# Patient Record
Sex: FEMALE | Race: AMERICAN INDIAN OR ALASKA NATIVE
[De-identification: names, ages, dates, MRNs, and addresses within clinical notes are randomized per-mention and may not be internally consistent; named-entity substitution may affect disease eponyms.]

---

## 2018-11-07 ENCOUNTER — HOSPITAL ENCOUNTER (EMERGENCY)
Dept: HOSPITAL 46 - ED | Age: 43
Discharge: HOME | End: 2018-11-07
Payer: COMMERCIAL

## 2018-11-07 VITALS — HEIGHT: 66 IN | WEIGHT: 270 LBS | BODY MASS INDEX: 43.39 KG/M2

## 2018-11-07 DIAGNOSIS — R00.2: ICD-10-CM

## 2018-11-07 DIAGNOSIS — Z79.4: ICD-10-CM

## 2018-11-07 DIAGNOSIS — I10: ICD-10-CM

## 2018-11-07 DIAGNOSIS — E78.00: ICD-10-CM

## 2018-11-07 DIAGNOSIS — E11.9: ICD-10-CM

## 2018-11-07 DIAGNOSIS — Z91.040: ICD-10-CM

## 2018-11-07 DIAGNOSIS — R07.89: Primary | ICD-10-CM

## 2018-11-07 NOTE — XMS
Encounter Summary
  Created on: 2018
 
 Catrachito Elam
 External Reference #: [id number]
 : [birth year]
 Sex: Female
 
 Demographics
 
 
+-----------------------+---------------------------+
| Address               | [address]       |
|                       | [place]  [address] |
+-----------------------+---------------------------+
| Home Phone            | [phone]           |
+-----------------------+---------------------------+
| Preferred Language    | Unknown                   |
+-----------------------+---------------------------+
| Marital Status        | Single                    |
+-----------------------+---------------------------+
| Quaker Affiliation | Unknown                   |
+-----------------------+---------------------------+
| Race                  | Unknown                   |
+-----------------------+---------------------------+
| Ethnic Group          | Unknown                   |
+-----------------------+---------------------------+
 
 
 Author
 
 
+--------------+-----------------------+
| Author       | [employer] |
+--------------+-----------------------+
| Organization | [facility] |
+--------------+-----------------------+
| Address      | Unknown               |
+--------------+-----------------------+
| Phone        | Unavailable           |
+--------------+-----------------------+
 
 
 
 Support
 
 
+--------------+--------------+---------+-----------------+
| Name         | Relationship | Address | Phone           |
+--------------+--------------+---------+-----------------+
| Renu Austin | ECON         | Unknown | [phone] |
+--------------+--------------+---------+-----------------+
 
 
 
 Care Team Providers
 
 
 
+------------------------+------+-----------------+
| Care Team Member Name  | Role | Phone           |
+------------------------+------+-----------------+
| Cassandra Teixeira PA-C | PCP  | [phone] |
+------------------------+------+-----------------+
 
 
 
 Encounter Details
 
 
+--------+-------------+----------------------+----------------------+-------------+
| Date   | Type        | Department           | Care Team            | Description |
+--------+-------------+----------------------+----------------------+-------------+
| / | Documentati |   [facility]             |   [facility] Villalobos,   |             |
| 2018   | on Only     | [facility]  | ARNP  [address]  |             |
|        |             |  [address]     | [address] [place],  |             |
|        |             | [place]  | [place] [address]             |             |
|        |             | [address]           | [phone]         |             |
|        |             | [phone]         | [phone] (Fax)   |             |
+--------+-------------+----------------------+----------------------+-------------+
 
 
 
 Social History
 
 
+--------------+-------+-----------+--------+------+
| Tobacco Use  | Types | Packs/Day | Years  | Date |
|              |       |           | Used   |      |
+--------------+-------+-----------+--------+------+
| Never Smoker |       |           |        |      |
+--------------+-------+-----------+--------+------+
 
 
 
+---------------------+---+---+---+
| Smokeless Tobacco:  |   |   |   |
| Never Used          |   |   |   |
+---------------------+---+---+---+
 
 
 
+------------------+---------------+
| Sex Assigned at  | Date Recorded |
| Birth            |               |
+------------------+---------------+
| Not on file      |               |
+------------------+---------------+
 as of this encounter
 
 Progress Jacqui Chavesderon, Teodora RACHEL - 2018  2:22 PM PDTImaging reportsin this encounter
 
 Plan of Treatment
 Not on fileas of this encounter
 
 Visit Diagnoses
 Not on filein this encounter

## 2018-11-07 NOTE — XMS
Encounter Summary
  Created on: 2018
 
 Catrachito Elam
 External Reference #: [id number]
 : [birth year]
 Sex: Female
 
 Demographics
 
 
+-----------------------+---------------------------+
| Address               | [address]       |
|                       | [place]  [address] |
+-----------------------+---------------------------+
| Home Phone            | [phone]           |
+-----------------------+---------------------------+
| Preferred Language    | Unknown                   |
+-----------------------+---------------------------+
| Marital Status        | Single                    |
+-----------------------+---------------------------+
| Quaker Affiliation | Unknown                   |
+-----------------------+---------------------------+
| Race                  | Unknown                   |
+-----------------------+---------------------------+
| Ethnic Group          | Unknown                   |
+-----------------------+---------------------------+
 
 
 Author
 
 
+--------------+-----------------------+
| Author       | [employer] |
+--------------+-----------------------+
| Organization | [facility] |
+--------------+-----------------------+
| Address      | Unknown               |
+--------------+-----------------------+
| Phone        | Unavailable           |
+--------------+-----------------------+
 
 
 
 Support
 
 
+--------------+--------------+---------+-----------------+
| Name         | Relationship | Address | Phone           |
+--------------+--------------+---------+-----------------+
| Renu Austin | ECON         | Unknown | [phone] |
+--------------+--------------+---------+-----------------+
 
 
 
 Care Team Providers
 
 
 
+------------------------+------+-----------------+
| Care Team Member Name  | Role | Phone           |
+------------------------+------+-----------------+
| Cassandra Teixeira PA-C | PCP  | [phone] |
+------------------------+------+-----------------+
 
 
 
 Encounter Details
 
 
+--------+------------+----------------------+-----------+-------------+
| Date   | Type       | Department           | Care Team | Description |
+--------+------------+----------------------+-----------+-------------+
| / | Procedure  |   [facility]     |           |             |
|    | Pass       | [facility] |           |             |
|        |            |  RADIOLOGY  [address]      |           |             |
|        |            | [address]           |           |             |
|        |            | [place] [address]   |           |             |
|        |            | [phone]         |           |             |
+--------+------------+----------------------+-----------+-------------+
 
 
 
 Social History
 
 
+--------------+-------+-----------+--------+------+
| Tobacco Use  | Types | Packs/Day | Years  | Date |
|              |       |           | Used   |      |
+--------------+-------+-----------+--------+------+
| Never Smoker |       |           |        |      |
+--------------+-------+-----------+--------+------+
 
 
 
+---------------------+---+---+---+
| Smokeless Tobacco:  |   |   |   |
| Never Used          |   |   |   |
+---------------------+---+---+---+
 
 
 
+------------------+---------------+
| Sex Assigned at  | Date Recorded |
| Birth            |               |
+------------------+---------------+
| Not on file      |               |
+------------------+---------------+
 as of this encounter
 
 Plan of Treatment
 Not on fileas of this encounter
 
 Visit Diagnoses
 Not on filein this encounter

## 2018-11-07 NOTE — XMS
Encounter Summary
  Created on: 2018
 
 Catrachito Elam
 External Reference #: [id number]
 : [birth year]
 Sex: Female
 
 Demographics
 
 
+-----------------------+---------------------------+
| Address               | [address]       |
|                       | [place]  [address] |
+-----------------------+---------------------------+
| Home Phone            | [phone]           |
+-----------------------+---------------------------+
| Preferred Language    | Unknown                   |
+-----------------------+---------------------------+
| Marital Status        | Single                    |
+-----------------------+---------------------------+
| Gnosticist Affiliation | Unknown                   |
+-----------------------+---------------------------+
| Race                  | Unknown                   |
+-----------------------+---------------------------+
| Ethnic Group          | Unknown                   |
+-----------------------+---------------------------+
 
 
 Author
 
 
+--------------+-----------------------+
| Author       | [employer] |
+--------------+-----------------------+
| Organization | [facility] |
+--------------+-----------------------+
| Address      | Unknown               |
+--------------+-----------------------+
| Phone        | Unavailable           |
+--------------+-----------------------+
 
 
 
 Support
 
 
+--------------+--------------+---------+-----------------+
| Name         | Relationship | Address | Phone           |
+--------------+--------------+---------+-----------------+
| Renu Austin | ECON         | Unknown | [phone] |
+--------------+--------------+---------+-----------------+
 
 
 
 Care Team Providers
 
 
 
+------------------------+------+-----------------+
| Care Team Member Name  | Role | Phone           |
+------------------------+------+-----------------+
| Cassandra Teixeira PA-C | PCP  | [phone] |
+------------------------+------+-----------------+
 
 
 
 Encounter Details
 
 
+--------+-----------+----------------------+--------------------+-------------+
| Date   | Type      | Department           | Care Team          | Description |
+--------+-----------+----------------------+--------------------+-------------+
| / | Telephone |   [facility]             |   [facility],  |             |
|    |           | [facility]  | CNA                |             |
|        |           |  [address]    |                    |             |
|        |           | [place]  [place], [place]  |                    |             |
|        |           | [address]           |                    |             |
|        |           | [phone]         |                    |             |
+--------+-----------+----------------------+--------------------+-------------+
 
 
 
 Social History
 
 
+--------------+-------+-----------+--------+------+
| Tobacco Use  | Types | Packs/Day | Years  | Date |
|              |       |           | Used   |      |
+--------------+-------+-----------+--------+------+
| Never Smoker |       |           |        |      |
+--------------+-------+-----------+--------+------+
 
 
 
+---------------------+---+---+---+
| Smokeless Tobacco:  |   |   |   |
| Never Used          |   |   |   |
+---------------------+---+---+---+
 
 
 
+------------------+---------------+
| Sex Assigned at  | Date Recorded |
| Birth            |               |
+------------------+---------------+
| Not on file      |               |
+------------------+---------------+
 as of this encounter
 
 Plan of Treatment
 Not on fileas of this encounter
 
 Visit Diagnoses
 Not on filein this encounter

## 2018-11-07 NOTE — XMS
Encounter Summary
  Created on: 2018
 
 Catrachito Elam
 External Reference #: [id number]
 : [birth year]
 Sex: Female
 
 Demographics
 
 
+-----------------------+---------------------------+
| Address               | [address]       |
|                       | [place]  [address] |
+-----------------------+---------------------------+
| Home Phone            | [phone]           |
+-----------------------+---------------------------+
| Preferred Language    | Unknown                   |
+-----------------------+---------------------------+
| Marital Status        | Single                    |
+-----------------------+---------------------------+
| Yazdanism Affiliation | Unknown                   |
+-----------------------+---------------------------+
| Race                  | Unknown                   |
+-----------------------+---------------------------+
| Ethnic Group          | Unknown                   |
+-----------------------+---------------------------+
 
 
 Author
 
 
+--------------+-----------------------+
| Author       | [employer] |
+--------------+-----------------------+
| Organization | [facility] |
+--------------+-----------------------+
| Address      | Unknown               |
+--------------+-----------------------+
| Phone        | Unavailable           |
+--------------+-----------------------+
 
 
 
 Support
 
 
+--------------+--------------+---------+-----------------+
| Name         | Relationship | Address | Phone           |
+--------------+--------------+---------+-----------------+
| Renu Austin | ECON         | Unknown | [phone] |
+--------------+--------------+---------+-----------------+
 
 
 
 Care Team Providers
 
 
 
+------------------------+------+-----------------+
| Care Team Member Name  | Role | Phone           |
+------------------------+------+-----------------+
| Cassandra Teixeira PA-C | PCP  | [phone] |
+------------------------+------+-----------------+
 
 
 
 Reason for Visit
 
 
+---------+-------------------------------------------------------------------+
| Reason  | Comments                                                          |
+---------+-------------------------------------------------------------------+
| Imaging | pt is requesting to speak to the MA, pt has her CT scan and xray  |
|         | info from last time she has them done.                            |
+---------+-------------------------------------------------------------------+
 
 
 
 Encounter Details
 
 
+--------+-----------+----------------------+----------------------+----------------------+
| Date   | Type      | Department           | Care Team            | Description          |
+--------+-----------+----------------------+----------------------+----------------------+
| / | Telephone |   [facility]             |   [facility] Villalobos,   | Imaging (pt is       |
| 2018   |           | [facility]  | ARNP  [address]  | requesting to speak  |
|        |           |  [address]    | [address],  | to the MA, pt has    |
|        |           | [place]  | [place] [address]             | her CT scan and xray |
|        |           | [address]           | [phone]         |  info from last time |
|        |           | [phone]         | [phone] (Fax)   |  she has them done.  |
|        |           |                      |                      | )                    |
+--------+-----------+----------------------+----------------------+----------------------+
 
 
 
 Social History
 
 
+--------------+-------+-----------+--------+------+
| Tobacco Use  | Types | Packs/Day | Years  | Date |
|              |       |           | Used   |      |
+--------------+-------+-----------+--------+------+
| Never Smoker |       |           |        |      |
+--------------+-------+-----------+--------+------+
 
 
 
+---------------------+---+---+---+
| Smokeless Tobacco:  |   |   |   |
| Never Used          |   |   |   |
+---------------------+---+---+---+
 
 
 
+------------------+---------------+
| Sex Assigned at  | Date Recorded |
| Birth            |               |
+------------------+---------------+
 
| Not on file      |               |
+------------------+---------------+
 as of this encounter
 
 Plan of Treatment
 Not on fileas of this encounter
 
 Visit Diagnoses
 Not on filein this encounter

## 2018-11-07 NOTE — XMS
Encounter Summary
  Created on: 2018
 
 Catrachito Block
 External Reference #: [id number]
 : [birth year]
 Sex: Female
 
 Demographics
 
 
+-----------------------+---------------------------+
| Address               | [address]       |
|                       | [place]  [address] |
+-----------------------+---------------------------+
| Home Phone            | [phone]           |
+-----------------------+---------------------------+
| Preferred Language    | Unknown                   |
+-----------------------+---------------------------+
| Marital Status        | Single                    |
+-----------------------+---------------------------+
| Scientology Affiliation | Unknown                   |
+-----------------------+---------------------------+
| Race                  | Unknown                   |
+-----------------------+---------------------------+
| Ethnic Group          | Unknown                   |
+-----------------------+---------------------------+
 
 
 Author
 
 
+--------------+-----------------------+
| Author       | [facility] |
+--------------+-----------------------+
| Organization | [facility] |
+--------------+-----------------------+
| Address      | Unknown               |
+--------------+-----------------------+
| Phone        | Unavailable           |
+--------------+-----------------------+
 
 
 
 Support
 
 
+--------------+--------------+---------+-----------------+
| Name         | Relationship | Address | Phone           |
+--------------+--------------+---------+-----------------+
| Renu Austin | ECON         | Unknown | [phone] |
+--------------+--------------+---------+-----------------+
 
 
 
 Care Team Providers
 
 
 
+------------------------+------+-----------------+
| Care Team Member Name  | Role | Phone           |
+------------------------+------+-----------------+
| Cassandra Teixeira PA-C | PCP  | [phone] |
+------------------------+------+-----------------+
 
 
 
 Encounter Details
 
 
+--------+-----------+----------------------+----------------------+----------------------+
| Date   | Type      | Department           | Care Team            | Description          |
+--------+-----------+----------------------+----------------------+----------------------+
| / | Hospital  |   [facility]    |   Diego Villalobos,   | Cervicalgia;         |
| 2018   | Encounter | [facility]  | ARNP  [address]  | Numbness and         |
|        |           | Xray  [address]   | Dr [address]ccurdy,  | tingling of right    |
|        |           | [address]        | [place] [address]             | arm; Numbness and    |
|        |           | [place] [address]   | [phone]         | tingling in left arm |
|        |           | [phone]         | [phone] (Fax)   |                      |
+--------+-----------+----------------------+----------------------+----------------------+
 
 
 
 Social History
 
 
+--------------+-------+-----------+--------+------+
| Tobacco Use  | Types | Packs/Day | Years  | Date |
|              |       |           | Used   |      |
+--------------+-------+-----------+--------+------+
| Never Smoker |       |           |        |      |
+--------------+-------+-----------+--------+------+
 
 
 
+---------------------+---+---+---+
| Smokeless Tobacco:  |   |   |   |
| Never Used          |   |   |   |
+---------------------+---+---+---+
 
 
 
+------------------+---------------+
| Sex Assigned at  | Date Recorded |
| Birth            |               |
+------------------+---------------+
| Not on file      |               |
+------------------+---------------+
 as of this encounter
 
 Medications at Time of Discharge
 
 
+----------------------+----------------------+--------+---------+----------+-----------+
| Medication           | Sig.                 | Disp.  | Refills | Start    | End Date  |
|                      |                      |        |         | Date     |           |
+----------------------+----------------------+--------+---------+----------+-----------+
|   Cholecalciferol    | Take 1,000 Units by  |        |         |          |           |
| 1000 units capsule   | mouth daily.         |        |         |          |           |
 
+----------------------+----------------------+--------+---------+----------+-----------+
|   gabapentin         | Take 100 mg by mouth |        |         |          |           |
| (NEURONTIN) 100 MG   |  3 (three) times     |        |         |          |           |
| capsule              | daily.               |        |         |          |           |
+----------------------+----------------------+--------+---------+----------+-----------+
|   Insulin Pen Needle | 1 each by Does not   |   100  | 11      | 20 |           |
|  (PEN NEEDLES) 31G X | apply route daily.   | each   |         | 18       |           |
|  6 MM MISC           |                      |        |         |          |           |
+----------------------+----------------------+--------+---------+----------+-----------+
|   liraglutide        | Inject 0.6 mg into   |   3 mL | 11      | 20 |           |
| (VICTOZA) 18 MG/3ML  | the skin daily.      |        |         | 18       |           |
| injection            |                      |        |         |          |           |
+----------------------+----------------------+--------+---------+----------+-----------+
|   metFORMIN          | Take 2 tablets by    |   60   | 11      | 01/15/20 | 01/15/201 |
| (GLUCOPHAGE-XR) 500  | mouth daily with     | tablet |         | 18       | 9         |
| MG 24 hr tablet      | dinner.              |        |         |          |           |
+----------------------+----------------------+--------+---------+----------+-----------+
|   insulin detemir    | Inject 70 Units into |        |         |          |  |
| (LEVEMIR) 100        |  the skin nightly.   |        |         |          | 8         |
| UNIT/ML              |                      |        |         |          |           |
| injectionIndications |                      |        |         |          |           |
| : 30 units AM, 40    |                      |        |         |          |           |
| units PM             |                      |        |         |          |           |
+----------------------+----------------------+--------+---------+----------+-----------+
|   insulin lispro,    | Inject 22 Units into |        |         |          |  |
| human, (HUMALOG) 100 |  the skin 3 (three)  |        |         |          | 8         |
|  UNIT/ML injection   | times daily before   |        |         |          |           |
|                      | meals.               |        |         |          |           |
+----------------------+----------------------+--------+---------+----------+-----------+
 as of this encounter
 
 Plan of Treatment
 Not on fileas of this encounter
 
 Procedures
 
 
+--------------------+--------+-------------+----------------------+----------------------+
| Procedure Name     | Priori | Date/Time   | Associated Diagnosis | Comments             |
|                    | ty     |             |                      |                      |
+--------------------+--------+-------------+----------------------+----------------------+
| XR CERVICAL SPINE  | Routin | 2018  |   Cervicalgia        |   Results for this   |
| LIMITED 2-3 VIEW   | e      |  4:13 PM    | Numbness and         | procedure are in the |
|                    |        | PDT         | tingling of right    |  results section.    |
|                    |        |             | arm  Numbness and    |                      |
|                    |        |             | tingling in left arm |                      |
+--------------------+--------+-------------+----------------------+----------------------+
 in this encounter
 
 Results
 X-ray cervical spine limited 2-3 view (2018  4:13 PM)
 
+--------------------------------------------------------------------+--------------+
| Impressions                                                        | Performed At |
+--------------------------------------------------------------------+--------------+
|   1.    Mild to moderate multilevel degenerative change of the     |   [facility]     |
| cervical spine.     Electronically signed by Kaden Benjamin DO on  | RADIOLOGY    |
| 2018 4:58 PM                                                  |              |
+--------------------------------------------------------------------+--------------+
 
 
 
 
+------------------------------------------------------------------------+--------------+
| Narrative                                                              | Performed At |
+------------------------------------------------------------------------+--------------+
|   CATRACHITO NICHOLSONMAN  1975  42 years Female  XR CERVICAL SPINE       |   [facility]     |
| LIMITED 2-3 VIEW  2018 4:13 PM     INDICATION: Neck pain          | RADIOLOGY    |
| COMPARISON: None     TECHNIQUE: Lateral flexion-extension views of the |              |
|  cervical spine.     FINDINGS: Vertebral heights and alignment are     |              |
| maintained. Mild to moderate disc space narrowing with endplate        |              |
| spurring from C4 through T1. Prevertebral soft tissues are             |              |
| unremarkable. Negative for fracture. No evidence for instability on    |              |
| flexion and extension images.                                          |              |
+------------------------------------------------------------------------+--------------+
 
 
 
+-------------------------------------------------------------------------------------------
--------------------------------------------------------------------------------------------
---------------------------+
| Procedure Note                                                                            
                                                                                            
                           |
+-------------------------------------------------------------------------------------------
--------------------------------------------------------------------------------------------
---------------------------+
|   [place], Rad Results In - 2018  5:03 PM PDT  CATRACHITO BLOCK[birth year]34 years         
                                                                                            
                           |
| FemaleXR CERVICAL SPINE LIMITED 2-3 VIEW2018 4:13 PMINDICATION: Neck                 
                                                                                            
                           |
| painCOMPARISON: NoneTECHNIQUE: Lateral flexion-extension views of the cervical            
                                                                                            
                           |
| spine.FINDINGS: Vertebral heights and alignment are maintained. Mild to moderate disc     
                                                                                            
                           |
| space narrowing with endplate spurring from C4 through T1. Prevertebral soft tissues are  
                                                                                            
                           |
|  unremarkable. Negative for fracture. No evidence for instability on flexion and          
                                                                                            
                           |
| extension images.IMPRESSION:1.  Mild to moderate multilevel degenerative change of the    
                                                                                            
                           |
| cervical spine.Electronically signed by Kaden Benjamin DO on 2018 4:58 PM            
                                                                                            
                           |
|COMPARISON: None                                                                           
                                                                                            
                           |
|                                                                                           
                                                                                            
                           |
|TECHNIQUE: Lateral flexion-extension views of the cervical spine.                          
                                                                                            
                           |
|                                                                                           
 
                                                                                            
                           |
|FINDINGS: Vertebral heights and alignment are maintained. Mild to moderate disc space narro
wing with endplate spurring from C4 through T1. Prevertebral soft tissues are unremarkable. 
Negative for fracture. No  |
|evidence for instability on flexion and extension images.                                  
                                                                                            
                           |
|                                                                                           
                                                                                            
                           |
|IMPRESSION:                                                                                
                                                                                            
                          |
|1.  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                 
                                                                                            
                           |
|                                                                                           
                                                                                            
                           |
|Electronically signed by Kaden Benjamin DO on 2018 4:58 PM                            
                                                                                            
                           |
+-------------------------------------------------------------------------------------------
--------------------------------------------------------------------------------------------
---------------------------+
 
 
 
+--------------------+------------------+--------------------+--------------+
| Performing         | Address          | City/State/Zipcode | Phone Number |
| Organization       |                  |                    |              |
+--------------------+------------------+--------------------+--------------+
|   [facility] |   [address] | [place] [address] |              |
+--------------------+------------------+--------------------+--------------+
 in this encounter
 
 Visit Diagnoses
 
 
+--------------------------------------+
| Diagnosis                            |
+--------------------------------------+
|   Cervicalgia                        |
+--------------------------------------+
|   Numbness and tingling of right arm |
+--------------------------------------+
|   Disturbance of skin sensation      |
+--------------------------------------+
|   Numbness and tingling in left arm  |
+--------------------------------------+
|   Disturbance of skin sensation      |
+--------------------------------------+
 
 
 
 Admitting Diagnoses
 
 
+--------------------------------------+
 
| Diagnosis                            |
+--------------------------------------+
|   Cervicalgia                        |
+--------------------------------------+
|   Numbness and tingling in left arm  |
+--------------------------------------+
|   Disturbance of skin sensation      |
+--------------------------------------+
|   Numbness and tingling of right arm |
+--------------------------------------+
|   Disturbance of skin sensation      |
+--------------------------------------+

## 2018-11-07 NOTE — XMS
Encounter Summary
  Created on: 2018
 
 Catrachito Elam
 External Reference #: [id number]
 : [birth year]
 Sex: Female
 
 Demographics
 
 
+-----------------------+---------------------------+
| Address               | [address]       |
|                       | [place]  [address] |
+-----------------------+---------------------------+
| Home Phone            | [phone]           |
+-----------------------+---------------------------+
| Preferred Language    | Unknown                   |
+-----------------------+---------------------------+
| Marital Status        | Single                    |
+-----------------------+---------------------------+
| Yazidi Affiliation | Unknown                   |
+-----------------------+---------------------------+
| Race                  | Unknown                   |
+-----------------------+---------------------------+
| Ethnic Group          | Unknown                   |
+-----------------------+---------------------------+
 
 
 Author
 
 
+--------------+-----------------------+
| Author       | [facility] |
+--------------+-----------------------+
| Organization | [facility] |
+--------------+-----------------------+
| Address      | Unknown               |
+--------------+-----------------------+
| Phone        | Unavailable           |
+--------------+-----------------------+
 
 
 
 Support
 
 
+--------------+--------------+---------+-----------------+
| Name         | Relationship | Address | Phone           |
+--------------+--------------+---------+-----------------+
| Renu Austin | ECON         | Unknown | [phone] |
+--------------+--------------+---------+-----------------+
 
 
 
 Care Team Providers
 
 
 
+------------------------+------+-----------------+
| Care Team Member Name  | Role | Phone           |
+------------------------+------+-----------------+
| Cassandra Teixeira PA-C | PCP  | [phone] |
+------------------------+------+-----------------+
 
 
 
 Encounter Details
 
 
+--------+------------+----------------------+-----------+-------------+
| Date   | Type       | Department           | Care Team | Description |
+--------+------------+----------------------+-----------+-------------+
| / | Procedure  |   [facility] PHYSICIAN     |           |             |
|    | Pass       | [facility] |           |             |
|        |            |  RADIOLOGY  [address]      |           |             |
|        |            | [address]           |           |             |
|        |            | [place] [address]   |           |             |
|        |            | [phone]         |           |             |
+--------+------------+----------------------+-----------+-------------+
 
 
 
 Social History
 
 
+--------------+-------+-----------+--------+------+
| Tobacco Use  | Types | Packs/Day | Years  | Date |
|              |       |           | Used   |      |
+--------------+-------+-----------+--------+------+
| Never Smoker |       |           |        |      |
+--------------+-------+-----------+--------+------+
 
 
 
+---------------------+---+---+---+
| Smokeless Tobacco:  |   |   |   |
| Never Used          |   |   |   |
+---------------------+---+---+---+
 
 
 
+------------------+---------------+
| Sex Assigned at  | Date Recorded |
| Birth            |               |
+------------------+---------------+
| Not on file      |               |
+------------------+---------------+
 as of this encounter
 
 Plan of Treatment
 Not on fileas of this encounter
 
 Visit Diagnoses
 Not on filein this encounter

## 2018-11-07 NOTE — XMS
Encounter Summary
  Created on: 2018
 
 Catrachito Ealm
 External Reference #: [id number]
 : [birth year]
 Sex: Female
 
 Demographics
 
 
+-----------------------+---------------------------+
| Address               | [address]       |
|                       | [place]  [address] |
+-----------------------+---------------------------+
| Home Phone            | [phone]           |
+-----------------------+---------------------------+
| Preferred Language    | Unknown                   |
+-----------------------+---------------------------+
| Marital Status        | Single                    |
+-----------------------+---------------------------+
| Anglican Affiliation | Unknown                   |
+-----------------------+---------------------------+
| Race                  | Unknown                   |
+-----------------------+---------------------------+
| Ethnic Group          | Unknown                   |
+-----------------------+---------------------------+
 
 
 Author
 
 
+--------------+-----------------------+
| Author       | [facility] |
+--------------+-----------------------+
| Organization | [facility] |
+--------------+-----------------------+
| Address      | Unknown               |
+--------------+-----------------------+
| Phone        | Unavailable           |
+--------------+-----------------------+
 
 
 
 Support
 
 
+--------------+--------------+---------+-----------------+
| Name         | Relationship | Address | Phone           |
+--------------+--------------+---------+-----------------+
| Renu Austin | ECON         | Unknown | [phone] |
+--------------+--------------+---------+-----------------+
 
 
 
 Care Team Providers
 
 
 
+------------------------+------+-----------------+
| Care Team Member Name  | Role | Phone           |
+------------------------+------+-----------------+
| Cassandra Teixeira PA-C | PCP  | [phone] |
+------------------------+------+-----------------+
 
 
 
 Reason for Referral
 Physical Medicine (Routine)
 
+------------+--------------+-----------+--------------+--------------+---------------+
| Status     | Reason       | Specialty | Diagnoses /  | Referred By  | Referred To   |
|            |              |           | Procedures   | Contact      | Contact       |
+------------+--------------+-----------+--------------+--------------+---------------+
| Authorized |   Specialty  | Physical  |   Diagnoses  |   [facility],   |   Therapy,    |
|            | Services     | Therapy   |  Cervicalgia | [facility] | [facility]       |
|            | Required     |           |              |   [address]       | [place]        |
|            |              |           |              | [address]  | Physical      |
|            |              |           |              | [address]        | [address]          |
|            |              |           |              | [place], [place] | [address] |
|            |              |           |              |  [address]       |   [place],  |
|            |              |           |              | Phone:       | [place] [address]      |
|            |              |           |              | [phone] | Phone:        |
|            |              |           |              |   Fax:       | [phone]  |
|            |              |           |              | [phone] |  Fax:         |
|            |              |           |              |              | [phone]  |
+------------+--------------+-----------+--------------+--------------+---------------+
 
 
 
 
 Encounter Details
 
 
+--------+-------------+----------------------+----------------------+----------------------
+
| Date   | Type        | Department           | Care Team            | Description          
|
+--------+-------------+----------------------+----------------------+----------------------
+
| / | Orders Only |   [facility]             |   [facility]upali Gu,  | Cervicalgia (Primary 
|
| 2018   |             [facility]  | [facility]                  |  Dx)                 
|
|        |             |  [address]    |                      |                      
|
|        |             | [place]  [place]  |                      |                      
|
|        |             | [address]           |                      |                      
|
|        |             | [phone]         |                      |                      
|
+--------+-------------+----------------------+----------------------+----------------------
+
 
 
 
 Social History
 
 
 
+--------------+-------+-----------+--------+------+
| Tobacco Use  | Types | Packs/Day | Years  | Date |
|              |       |           | Used   |      |
+--------------+-------+-----------+--------+------+
| Never Smoker |       |           |        |      |
+--------------+-------+-----------+--------+------+
 
 
 
+---------------------+---+---+---+
| Smokeless Tobacco:  |   |   |   |
| Never Used          |   |   |   |
+---------------------+---+---+---+
 
 
 
+------------------+---------------+
| Sex Assigned at  | Date Recorded |
| Birth            |               |
+------------------+---------------+
| Not on file      |               |
+------------------+---------------+
 as of this encounter
 
 Plan of Treatment
 
 
+----------------------------------+--------+----------------------+---------------------+
| Name                             | Priori | Associated Diagnoses | Order Schedule      |
|                                  | ty     |                      |                     |
+----------------------------------+--------+----------------------+---------------------+
| Ambulatory referral to Physical  | Routin |   Cervicalgia        | Ordered: 2018 |
| Therapy, Eval and Treat          | e      |                      |                     |
+----------------------------------+--------+----------------------+---------------------+
 as of this encounter
 
 Visit Diagnoses
 
 
+-------------------------+
| Diagnosis               |
+-------------------------+
|   Cervicalgia - Primary |
+-------------------------+

## 2018-11-07 NOTE — XMS
Encounter Summary
  Created on: 2018
 
 Catrachito Elam
 External Reference #: [id number]
 : [birth year]
 Sex: Female
 
 Demographics
 
 
+-----------------------+---------------------------+
| Address               | [address]       |
|                       | [place]  [address] |
+-----------------------+---------------------------+
| Home Phone            | [phone]           |
+-----------------------+---------------------------+
| Preferred Language    | Unknown                   |
+-----------------------+---------------------------+
| Marital Status        | Single                    |
+-----------------------+---------------------------+
| Mor[employer] Affiliation | Unknown                   |
+-----------------------+---------------------------+
| Race                  | Unknown                   |
+-----------------------+---------------------------+
| Ethnic Group          | Unknown                   |
+-----------------------+---------------------------+
 
 
 Author
 
 
+--------------+-----------------------+
| Author       | [facility] |
+--------------+-----------------------+
| Organization | [facility] |
+--------------+-----------------------+
| Address      | Unknown               |
+--------------+-----------------------+
| Phone        | Unavailable           |
+--------------+-----------------------+
 
 
 
 Support
 
 
+--------------+--------------+---------+-----------------+
| Name         | Relationship | Address | Phone           |
+--------------+--------------+---------+-----------------+
| Renu Austin | ECON         | Unknown | [phone] |
+--------------+--------------+---------+-----------------+
 
 
 
 Care Team Providers
 
 
 
+------------------------+------+-----------------+
| Care Team Member Name  | Role | Phone           |
+------------------------+------+-----------------+
| Cassandra Teixeira PA-C | PCP  | [phone] |
+------------------------+------+-----------------+
 
 
 
 Reason for Referral
 MRI/CAT Scan (Routine)
 
+----------+--------+-----------+--------------+--------------+--------------+
| Status   | Reason | Specialty | Diagnoses /  | Referred By  | Referred To  |
|          |        |           | Procedures   | Contact      | Contact      |
+----------+--------+-----------+--------------+--------------+--------------+
| Pending  |        | Radiology |   Diagnoses  |   See,       |              |
| Review   |        |           |  Diagnosis   | Medical      |              |
|          |        |           | unknown      | Record  [address] |              |
|          |        |           | Procedures   |  [address]   |              |
|          |        |           | MRI brain    | [address]       |              |
|          |        |           | without      | [place]   |              |
|          |        |           | contrast     | [address]        |              |
|          |        |           |              | Phone:       |              |
|          |        |           |              | [phone] |              |
|          |        |           |              |   Fax:       |              |
|          |        |           |              | [phone] |              |
+----------+--------+-----------+--------------+--------------+--------------+
 
 
 MRI/CAT Scan (Routine)
 
+----------+--------+-----------+--------------+--------------+--------------+
| Status   | Reason | Specialty | Diagnoses /  | Referred By  | Referred To  |
|          |        |           | Procedures   | Contact      | Contact      |
+----------+--------+-----------+--------------+--------------+--------------+
| Pending  |        | Radiology |   Diagnoses  |   See,       |              |
| Review   |        |           |  Diagnosis   | Medical      |              |
|          |        |           | unknown      | Record  [address] |              |
|          |        |           | Procedures   |  [address]   |              |
|          |        |           | MRI cervical | [address]       |              |
|          |        |           |  spine       | [place]   |              |
|          |        |           | without      | [address]        |              |
|          |        |           | contrast     | Phone:       |              |
|          |        |           |              | [phone] |              |
|          |        |           |              |   Fax:       |              |
|          |        |           |              | [phone] |              |
+----------+--------+-----------+--------------+--------------+--------------+
 
 
 
 
 Encounter Details
 
 
+--------+------------+----------------------+----------------------+-------------------+
| Date   | Type       | Department           | Care Team            | Description       |
+--------+------------+----------------------+----------------------+-------------------+
| / | Ancillary  |   [facility]    |   See, Medical       | Diagnosis unknown |
| 2018   | [facility]     | [facility]   | Record  [address]   |                   |
|        |            | [address] [facility]       | [address] [place] [address], |                   |
 
|        |            | [place] [address]   |  [place] [address]            |                   |
|        |            | [phone]         | [phone]         |                   |
|        |            |                      | [phone] (Fax)   |                   |
+--------+------------+----------------------+----------------------+-------------------+
 
 
 
 Social History
 
 
+--------------+-------+-----------+--------+------+
| Tobacco Use  | Types | Packs/Day | Years  | Date |
|              |       |           | Used   |      |
+--------------+-------+-----------+--------+------+
| Never Smoker |       |           |        |      |
+--------------+-------+-----------+--------+------+
 
 
 
+---------------------+---+---+---+
| Smokeless Tobacco:  |   |   |   |
| Never Used          |   |   |   |
+---------------------+---+---+---+
 
 
 
+------------------+---------------+
| Sex Assigned at  | Date Recorded |
| Birth            |               |
+------------------+---------------+
| Not on file      |               |
+------------------+---------------+
 as of this encounter
 
 Plan of Treatment
 Not on fileas of this encounter
 
 Results
 MRI brain without contrast (2016  4:04 PM)
 
+------------------------------------------------------------------------+--------------+
| Narrative                                                              | Performed At |
+------------------------------------------------------------------------+--------------+
|   This is a non-reportable procedure without a radiologist report and  |   [facility]     |
| is   used for image storage only                                       | RADIOLOGY    |
+------------------------------------------------------------------------+--------------+
 
 
 
+--------------------+------------------+--------------------+--------------+
| Performing         | Address          | City/State/Zipcode | Phone Number |
| Organization       |                  |                    |              |
+--------------------+------------------+--------------------+--------------+
|   [facility] |   [address] | [place] [address] |              |
+--------------------+------------------+--------------------+--------------+
 MRI cervical spine without contrast (2016  4:02 PM)
 
+------------------------------------------------------------------------+--------------+
| Narrative                                                              | Performed At |
+------------------------------------------------------------------------+--------------+
 
|   This is a non-reportable procedure without a radiologist report and  |   [facility]     |
| is   used for image storage only                                       | RADIOLOGY    |
+------------------------------------------------------------------------+--------------+
 
 
 
+--------------------+------------------+--------------------+--------------+
| Performing         | Address          | City/State/Zipcode | Phone Number |
| Organization       |                  |                    |              |
+--------------------+------------------+--------------------+--------------+
|   [facility] |   [address] | [place] [address] |              |
+--------------------+------------------+--------------------+--------------+
 in this encounter
 
 Visit Diagnoses
 
 
+-----------------------------------------------------------------+
| Diagnosis                                                       |
+-----------------------------------------------------------------+
|   Diagnosis unknown                                             |
+-----------------------------------------------------------------+
|   Other unknown and unspecified cause of morbidity or mortality |
+-----------------------------------------------------------------+

## 2018-11-07 NOTE — XMS
Encounter Summary
  Created on: 2018
 
 Catrachito Elam
 External Reference #: [id number]
 : [birth year]
 Sex: Female
 
 Demographics
 
 
+-----------------------+---------------------------+
| Address               | [address]       |
|                       | [place]  [address] |
+-----------------------+---------------------------+
| Home Phone            | [phone]           |
+-----------------------+---------------------------+
| Preferred Language    | Unknown                   |
+-----------------------+---------------------------+
| Marital Status        | Single                    |
+-----------------------+---------------------------+
| Protestant Affiliation | Unknown                   |
+-----------------------+---------------------------+
| Race                  | Unknown                   |
+-----------------------+---------------------------+
| Ethnic Group          | Unknown                   |
+-----------------------+---------------------------+
 
 
 Author
 
 
+--------------+-----------------------+
| Author       | [facility] |
+--------------+-----------------------+
| Organization | [facility] |
+--------------+-----------------------+
| Address      | Unknown               |
+--------------+-----------------------+
| Phone        | Unavailable           |
+--------------+-----------------------+
 
 
 
 Support
 
 
+--------------+--------------+---------+-----------------+
| Name         | Relationship | Address | Phone           |
+--------------+--------------+---------+-----------------+
| Renu Austin | ECON         | Unknown | [phone] |
+--------------+--------------+---------+-----------------+
 
 
 
 Care Team Providers
 
 
 
+------------------------+------+-----------------+
| Care Team Member Name  | Role | Phone           |
+------------------------+------+-----------------+
| Cassandra Teixeira PA-C | PCP  | [phone] |
+------------------------+------+-----------------+
 
 
 
 Reason for Referral
 MRI/CAT Scan (Routine)
 
+----------+--------+-----------+--------------+--------------+--------------+
| Status   | Reason | Specialty | Diagnoses /  | Referred By  | Referred To  |
|          |        |           | Procedures   | Contact      | Contact      |
+----------+--------+-----------+--------------+--------------+--------------+
| Pending  |        | Radiology |   Diagnoses  |   See,       |              |
| Review   |        |           |  Diagnosis   | Medical      |              |
|          |        |           | unknown      | Record  [address] |              |
|          |        |           | Procedures   |  [address]   |              |
|          |        |           | MRI brain    | [address]       |              |
|          |        |           | without      | [place]   |              |
|          |        |           | contrast     | [address]        |              |
|          |        |           |              | Phone:       |              |
|          |        |           |              | [phone] |              |
|          |        |           |              |   Fax:       |              |
|          |        |           |              | [phone] |              |
+----------+--------+-----------+--------------+--------------+--------------+
 
 
 MRI/CAT Scan (Routine)
 
+----------+--------+-----------+--------------+--------------+--------------+
| Status   | Reason | Specialty | Diagnoses /  | Referred By  | Referred To  |
|          |        |           | Procedures   | Contact      | Contact      |
+----------+--------+-----------+--------------+--------------+--------------+
| Pending  |        | Radiology |   Diagnoses  |   See,       |              |
| Review   |        |           |  Diagnosis   | Medical      |              |
|          |        |           | unknown      | Record  [address] |              |
|          |        |           | Procedures   |  [address]   |              |
|          |        |           | MRI cervical | [address]       |              |
|          |        |           |  spine       | [place]   |              |
|          |        |           | without      | [id number]        |              |
|          |        |           | contrast     | Phone:       |              |
|          |        |           |              | [phone] |              |
|          |        |           |              |   Fax:       |              |
|          |        |           |              | [phone] |              |
+----------+--------+-----------+--------------+--------------+--------------+
 
 
 
 
 Encounter Details
 
 
+--------+------------+----------------------+----------------------+-------------------+
| Date   | Type       | Department           | Care Team            | Description       |
+--------+------------+----------------------+----------------------+-------------------+
| / | Ancillary  |   [facility]    |   See, Medical       | Diagnosis unknown |
| 2018   | [facility]     | [facility]   | Record  [address]   |                   |
|        |            | [address]       | [address] [place] [address], |                   |
 
|        |            | [place] [address]   |  [place] [address]            |                   |
|        |            | [phone]         | [phone]         |                   |
|        |            |                      | [phone] (Fax)   |                   |
+--------+------------+----------------------+----------------------+-------------------+
 
 
 
 Social History
 
 
+--------------+-------+-----------+--------+------+
| Tobacco Use  | Types | Packs/Day | Years  | Date |
|              |       |           | Used   |      |
+--------------+-------+-----------+--------+------+
| Never Smoker |       |           |        |      |
+--------------+-------+-----------+--------+------+
 
 
 
+---------------------+---+---+---+
| Smokeless Tobacco:  |   |   |   |
| Never Used          |   |   |   |
+---------------------+---+---+---+
 
 
 
+------------------+---------------+
| Sex Assigned at  | Date Recorded |
| Birth            |               |
+------------------+---------------+
| Not on file      |               |
+------------------+---------------+
 as of this encounter
 
 Plan of Treatment
 Not on fileas of this encounter
 
 Results
 MRI brain without contrast (2016  4:04 PM)
 
+------------------------------------------------------------------------+--------------+
| Narrative                                                              | Performed At |
+------------------------------------------------------------------------+--------------+
|   This is a non-reportable procedure without a radiologist report and  |   [facility]     |
| is   used for image storage only                                       | RADIOLOGY    |
+------------------------------------------------------------------------+--------------+
 
 
 
+--------------------+------------------+--------------------+--------------+
| Performing         | Address          | City/State/Zipcode | Phone Number |
| Organization       |                  |                    |              |
+--------------------+------------------+--------------------+--------------+
|   [facility] |   [address] | [place] [address] |              |
+--------------------+------------------+--------------------+--------------+
 MRI cervical spine without contrast (2016  4:02 PM)
 
+------------------------------------------------------------------------+--------------+
| Narrative                                                              | Performed At |
+------------------------------------------------------------------------+--------------+
 
|   This is a non-reportable procedure without a radiologist report and  |   [facility]     |
| is   used for image storage only                                       | RADIOLOGY    |
+------------------------------------------------------------------------+--------------+
 
 
 
+--------------------+------------------+--------------------+--------------+
| Performing         | Address          | City/State/Zipcode | Phone Number |
| Organization       |                  |                    |              |
+--------------------+------------------+--------------------+--------------+
|   [facility] |   [address] | [place] [address] |              |
+--------------------+------------------+--------------------+--------------+
 in this encounter
 
 Visit Diagnoses
 
 
+-----------------------------------------------------------------+
| Diagnosis                                                       |
+-----------------------------------------------------------------+
|   Diagnosis unknown                                             |
+-----------------------------------------------------------------+
|   Other unknown and unspecified cause of morbidity or mortality |
+-----------------------------------------------------------------+

## 2018-11-07 NOTE — XMS
Encounter Summary
  Created on: 2018
 
 Catrachito Elam
 External Reference #: [id number]
 : [birth year]
 Sex: Female
 
 Demographics
 
 
+-----------------------+---------------------------+
| Address               | [address]       |
|                       | [place]  [address] |
+-----------------------+---------------------------+
| Home Phone            | [phone]           |
+-----------------------+---------------------------+
| Preferred Language    | Unknown                   |
+-----------------------+---------------------------+
| Marital Status        | Single                    |
+-----------------------+---------------------------+
| Religion Affiliation | Unknown                   |
+-----------------------+---------------------------+
| Race                  | Unknown                   |
+-----------------------+---------------------------+
| Ethnic Group          | Unknown                   |
+-----------------------+---------------------------+
 
 
 Author
 
 
+--------------+-----------------------+
| Author       | [facility] |
+--------------+-----------------------+
| Organization | [facility] |
+--------------+-----------------------+
| Address      | Unknown               |
+--------------+-----------------------+
| Phone        | Unavailable           |
+--------------+-----------------------+
 
 
 
 Support
 
 
+--------------+--------------+---------+-----------------+
| Name         | Relationship | Address | Phone           |
+--------------+--------------+---------+-----------------+
| Renu Austin | ECON         | Unknown | [phone] |
+--------------+--------------+---------+-----------------+
 
 
 
 Care Team Providers
 
 
 
+------------------------+------+-----------------+
| Care Team Member Name  | Role | Phone           |
+------------------------+------+-----------------+
| Cassandra Teixeira PA-C | PCP  | [phone] |
+------------------------+------+-----------------+
 
 
 
 Reason for Visit
 MRI/CAT Scan (Routine)
 
+----------+--------+-----------+--------------+--------------+--------------+
| Status   | Reason | Specialty | Diagnoses /  | Referred By  | Referred To  |
|          |        |           | Procedures   | Contact      | Contact      |
+----------+--------+-----------+--------------+--------------+--------------+
| Pending  |        | Radiology |   Diagnoses  |   See,       |              |
| Review   |        |           |  Diagnosis   | Medical      |              |
|          |        |           | unknown      | Record  [address] |              |
|          |        |           | Procedures   |  [address]   |              |
|          |        |           | MRI brain    | avenue       |              |
|          |        |           | without      | [place]   |              |
|          |        |           | contrast     | [address]        |              |
|          |        |           |              | Phone:       |              |
|          |        |           |              | [phone] |              |
|          |        |           |              |   Fax:       |              |
|          |        |           |              | [phone] |              |
+----------+--------+-----------+--------------+--------------+--------------+
 
 
 
 
 Encounter Details
 
 
+--------+-----------+----------------------+----------------------+-------------------+
| Date   | Type      | Department           | Care Team            | Description       |
+--------+-----------+----------------------+----------------------+-------------------+
| / | Hospital  |   [facility]     |   See, Medical       | Diagnosis unknown |
|    | Encounter | [facility] | Record  [address]   |                   |
|        |           |  RADIOLOGY  [address]      | [facility] |                   |
|        |           | [facility]           |  [place] [address]            |                   |
|        |           | [place] [address]   | [phone]         |                   |
|        |           | [phone]         | [phone] (Fax)   |                   |
+--------+-----------+----------------------+----------------------+-------------------+
 
 
 
 Social History
 
 
+--------------+-------+-----------+--------+------+
| Tobacco Use  | Types | Packs/Day | Years  | Date |
|              |       |           | Used   |      |
+--------------+-------+-----------+--------+------+
| Never Smoker |       |           |        |      |
+--------------+-------+-----------+--------+------+
 
 
 
+---------------------+---+---+---+
 
| Smokeless Tobacco:  |   |   |   |
| Never Used          |   |   |   |
+---------------------+---+---+---+
 
 
 
+------------------+---------------+
| Sex Assigned at  | Date Recorded |
| Birth            |               |
+------------------+---------------+
| Not on file      |               |
+------------------+---------------+
 as of this encounter
 
 Medications at Time of Discharge
 
 
+----------------------+----------------------+--------+---------+----------+-----------+
| Medication           | Sig.                 | Disp.  | Refills | Start    | End Date  |
|                      |                      |        |         | Date     |           |
+----------------------+----------------------+--------+---------+----------+-----------+
|   Cholecalciferol    | Take 1,000 Units by  |        |         |          |           |
| 1000 units capsule   | mouth daily.         |        |         |          |           |
+----------------------+----------------------+--------+---------+----------+-----------+
|   gabapentin         | Take 100 mg by mouth |        |         |          |           |
| (NEURONTIN) 100 MG   |  3 (three) times     |        |         |          |           |
| capsule              | daily.               |        |         |          |           |
+----------------------+----------------------+--------+---------+----------+-----------+
|   Insulin Pen Needle | 1 each by Does not   |   100  | 11      | 20 |           |
|  (PEN NEEDLES) 31G X | apply route daily.   | each   |         | 18       |           |
|  6 MM MISC           |                      |        |         |          |           |
+----------------------+----------------------+--------+---------+----------+-----------+
|   liraglutide        | Inject 0.6 mg into   |   3 mL | 11      | 20 |           |
| (VICTOZA) 18 MG/3ML  | the skin daily.      |        |         | 18       |           |
| injection            |                      |        |         |          |           |
+----------------------+----------------------+--------+---------+----------+-----------+
|   metFORMIN          | Take 2 tablets by    |   60   | 11      | 01/15/20 | 01/15/201 |
| (GLUCOPHAGE-XR) 500  | mouth daily with     | tablet |         | 18       | 9         |
| MG 24 hr tablet      | dinner.              |        |         |          |           |
+----------------------+----------------------+--------+---------+----------+-----------+
|   insulin detemir    | Inject 70 Units into |        |         |          |  |
| (LEVEMIR) 100        |  the skin nightly.   |        |         |          | 8         |
| UNIT/ML              |                      |        |         |          |           |
| injectionIndications |                      |        |         |          |           |
| : 30 units AM, 40    |                      |        |         |          |           |
| units PM             |                      |        |         |          |           |
+----------------------+----------------------+--------+---------+----------+-----------+
|   insulin lispro,    | Inject 22 Units into |        |         |          |  |
| human, (HUMALOG) 100 |  the skin 3 (three)  |        |         |          | 8         |
|  UNIT/ML injection   | times daily before   |        |         |          |           |
|                      | meals.               |        |         |          |           |
+----------------------+----------------------+--------+---------+----------+-----------+
 as of this encounter
 
 Plan of Treatment
 Not on fileas of this encounter
 
 Procedures
 
 
 
+----------------+--------+-------------+----------------------+----------------------+
| Procedure Name | Priori | Date/Time   | Associated Diagnosis | Comments             |
|                | ty     |             |                      |                      |
+----------------+--------+-------------+----------------------+----------------------+
| MRI BRAIN WO   | Routin | 2016  |   Diagnosis unknown  |   Results for this   |
| CONTRAST       | e      |  4:04 PM    |                      | procedure are in the |
|                |        | PST         |                      |  results section.    |
+----------------+--------+-------------+----------------------+----------------------+
 in this encounter
 
 Results
 MRI brain without contrast (2016  4:04 PM)
 
+------------------------------------------------------------------------+--------------+
| Narrative                                                              | Performed At |
+------------------------------------------------------------------------+--------------+
|   This is a non-reportable procedure without a radiologist report and  |   [facility]     |
| is   used for image storage only                                       | RADIOLOGY    |
+------------------------------------------------------------------------+--------------+
 
 
 
+--------------------+------------------+--------------------+--------------+
| Performing         | Address          | City/State/Zipcode | Phone Number |
| Organization       |                  |                    |              |
+--------------------+------------------+--------------------+--------------+
|   [facility] |   [address] | [place] [address] |              |
+--------------------+------------------+--------------------+--------------+
 in this encounter
 
 Visit Diagnoses
 
 
+-----------------------------------------------------------------+
| Diagnosis                                                       |
+-----------------------------------------------------------------+
|   Diagnosis unknown                                             |
+-----------------------------------------------------------------+
|   Other unknown and unspecified cause of morbidity or mortality |
+-----------------------------------------------------------------+

## 2018-11-07 NOTE — XMS
Encounter Summary
  Created on: 2018
 
 Catrachito Elam
 External Reference #: [id number]
 : [birth year]
 Sex: Female
 
 Demographics
 
 
+-----------------------+---------------------------+
| Address               | [address]       |
|                       | [place]  [address] |
+-----------------------+---------------------------+
| Home Phone            | [phone]           |
+-----------------------+---------------------------+
| Preferred Language    | Unknown                   |
+-----------------------+---------------------------+
| Marital Status        | Single                    |
+-----------------------+---------------------------+
| Uatsdin Affiliation | Unknown                   |
+-----------------------+---------------------------+
| Race                  | Unknown                   |
+-----------------------+---------------------------+
| Ethnic Group          | Unknown                   |
+-----------------------+---------------------------+
 
 
 Author
 
 
+--------------+-----------------------+
| Author       | [facility] |
+--------------+-----------------------+
| Organization | [facility] |
+--------------+-----------------------+
| Address      | Unknown               |
+--------------+-----------------------+
| Phone        | Unavailable           |
+--------------+-----------------------+
 
 
 
 Support
 
 
+--------------+--------------+---------+-----------------+
| Name         | Relationship | Address | Phone           |
+--------------+--------------+---------+-----------------+
| Renu Austin | ECON         | Unknown | [phone] |
+--------------+--------------+---------+-----------------+
 
 
 
 Care Team Providers
 
 
 
+------------------------+------+-----------------+
| Care Team Member Name  | Role | Phone           |
+------------------------+------+-----------------+
| Cassandra Teixeira PA-C | PCP  | [phone] |
+------------------------+------+-----------------+
 
 
 
 Reason for Visit
 
 
+-------------------+------------------------------------------------------------------+
| Reason            | Comments                                                         |
+-------------------+------------------------------------------------------------------+
| Medication Refill | Pen Rx refill request. Also pt only has  3 doses of her insulin  |
|                   | left.                                                            |
+-------------------+------------------------------------------------------------------+
 
 
 
 Encounter Details
 
 
+--------+--------+----------------------+----------------------+-------------+
| Date   | Type   | Department           | Care Team            | Description |
+--------+--------+----------------------+----------------------+-------------+
| 10/30/ | Refill |   [facility]      |   Brodie Celaya,  |             |
|    |        | [address]  | MD  [address]    |             |
|        |        | [address]    | [facility] [address]         |             |
|        |        | [place], [place]         | [place] [address]   |             |
|        |        | [address]           | [phone]         |             |
|        |        | [phone]         | [phone] (Fax)   |             |
+--------+--------+----------------------+----------------------+-------------+
 
 
 
 Social History
 
 
+--------------+-------+-----------+--------+------+
| Tobacco Use  | Types | Packs/Day | Years  | Date |
|              |       |           | Used   |      |
+--------------+-------+-----------+--------+------+
| Never Smoker |       |           |        |      |
+--------------+-------+-----------+--------+------+
 
 
 
+---------------------+---+---+---+
| Smokeless Tobacco:  |   |   |   |
| Never Used          |   |   |   |
+---------------------+---+---+---+
 
 
 
+------------------+---------------+
| Sex Assigned at  | Date Recorded |
| Birth            |               |
+------------------+---------------+
| Not on file      |               |
 
+------------------+---------------+
 as of this encounter
 
 Plan of Treatment
 Not on fileas of this encounter
 
 Visit Diagnoses
 Not on filein this encounter

## 2018-11-07 NOTE — XMS
Encounter Summary
  Created on: 2018
 
 Catrachito Elam
 External Reference #: [id number]
 : [birth year]
 Sex: Female
 
 Demographics
 
 
+-----------------------+---------------------------+
| Address               | [address]       |
|                       | [place]  [address] |
+-----------------------+---------------------------+
| Home Phone            | [phone]           |
+-----------------------+---------------------------+
| Preferred Language    | Unknown                   |
+-----------------------+---------------------------+
| Marital Status        | Single                    |
+-----------------------+---------------------------+
| Taoism Affiliation | Unknown                   |
+-----------------------+---------------------------+
| Race                  | Unknown                   |
+-----------------------+---------------------------+
| Ethnic Group          | Unknown                   |
+-----------------------+---------------------------+
 
 
 Author
 
 
+--------------+-----------------------+
| Author       | [email or web address] |
+--------------+-----------------------+
| Organization | [facility] |
+--------------+-----------------------+
| Address      | Unknown               |
+--------------+-----------------------+
| Phone        | Unavailable           |
+--------------+-----------------------+
 
 
 
 Support
 
 
+--------------+--------------+---------+-----------------+
| Name         | Relationship | Address | Phone           |
+--------------+--------------+---------+-----------------+
| Renu Austin | ECON         | Unknown | [phone] |
+--------------+--------------+---------+-----------------+
 
 
 
 Care Team Providers
 
 
 
+------------------------+------+-----------------+
| Care Team Member Name  | Role | Phone           |
+------------------------+------+-----------------+
| Cassandra Teixeira PA-C | PCP  | [phone] |
+------------------------+------+-----------------+
 
 
 
 Reason for Visit
 MRI/CAT Scan (Routine)
 
+----------+--------+-----------+--------------+--------------+--------------+
| Status   | Reason | Specialty | Diagnoses /  | Referred By  | Referred To  |
|          |        |           | Procedures   | Contact      | Contact      |
+----------+--------+-----------+--------------+--------------+--------------+
| Pending  |        | Radiology |   Diagnoses  |   See,       |              |
| Review   |        |           |  Diagnosis   | Medical      |              |
|          |        |           | unknown      | Record  [address] |              |
|          |        |           | Procedures   |  [place]   |              |
|          |        |           | MRI cervical | avenue       |              |
|          |        |           |  spine       | [place]   |              |
|          |        |           | without      | [address]        |              |
|          |        |           | contrast     | Phone:       |              |
|          |        |           |              | [phone] |              |
|          |        |           |              |   Fax:       |              |
|          |        |           |              | [phone] |              |
+----------+--------+-----------+--------------+--------------+--------------+
 
 
 
 
 Encounter Details
 
 
+--------+-----------+----------------------+----------------------+-------------------+
| Date   | Type      | Department           | Care Team            | Description       |
+--------+-----------+----------------------+----------------------+-------------------+
| / | Hospital  |   [facility] PHYSICIAN     |   See, Medical       | Diagnosis unknown |
|    | Encounter | [facility] | Record  [address]   |                   |
|        |           |  RADIOLOGY  [address]      | [address] |                   |
|        |           | [facility] [place]           |  [place] [address]            |                   |
|        |           | [place] [address]   | [phone]         |                   |
|        |           | [phone]         | [phone] (Fax)   |                   |
+--------+-----------+----------------------+----------------------+-------------------+
 
 
 
 Social History
 
 
+--------------+-------+-----------+--------+------+
| Tobacco Use  | Types | Packs/Day | Years  | Date |
|              |       |           | Used   |      |
+--------------+-------+-----------+--------+------+
| Never Smoker |       |           |        |      |
+--------------+-------+-----------+--------+------+
 
 
 
+---------------------+---+---+---+
 
| Smokeless Tobacco:  |   |   |   |
| Never Used          |   |   |   |
+---------------------+---+---+---+
 
 
 
+------------------+---------------+
| Sex Assigned at  | Date Recorded |
| Birth            |               |
+------------------+---------------+
| Not on file      |               |
+------------------+---------------+
 as of this encounter
 
 Medications at Time of Discharge
 
 
+----------------------+----------------------+--------+---------+----------+-----------+
| Medication           | Sig.                 | Disp.  | Refills | Start    | End Date  |
|                      |                      |        |         | Date     |           |
+----------------------+----------------------+--------+---------+----------+-----------+
|   Cholecalciferol    | Take 1,000 Units by  |        |         |          |           |
| 1000 units capsule   | mouth daily.         |        |         |          |           |
+----------------------+----------------------+--------+---------+----------+-----------+
|   gabapentin         | Take 100 mg by mouth |        |         |          |           |
| (NEURONTIN) 100 MG   |  3 (three) times     |        |         |          |           |
| capsule              | daily.               |        |         |          |           |
+----------------------+----------------------+--------+---------+----------+-----------+
|   Insulin Pen Needle | 1 each by Does not   |   100  | 11      | 20 |           |
|  (PEN NEEDLES) 31G X | apply route daily.   | each   |         | 18       |           |
|  6 MM MISC           |                      |        |         |          |           |
+----------------------+----------------------+--------+---------+----------+-----------+
|   liraglutide        | Inject 0.6 mg into   |   3 mL | 11      | 20 |           |
| (VICTOZA) 18 MG/3ML  | the skin daily.      |        |         | 18       |           |
| injection            |                      |        |         |          |           |
+----------------------+----------------------+--------+---------+----------+-----------+
|   metFORMIN          | Take 2 tablets by    |   60   | 11      | 01/15/20 | 01/15/201 |
| (GLUCOPHAGE-XR) 500  | mouth daily with     | tablet |         | 18       | 9         |
| MG 24 hr tablet      | dinner.              |        |         |          |           |
+----------------------+----------------------+--------+---------+----------+-----------+
|   insulin detemir    | Inject 70 Units into |        |         |          |  |
| (LEVEMIR) 100        |  the skin nightly.   |        |         |          | 8         |
| UNIT/ML              |                      |        |         |          |           |
| injectionIndications |                      |        |         |          |           |
| : 30 units AM, 40    |                      |        |         |          |           |
| units PM             |                      |        |         |          |           |
+----------------------+----------------------+--------+---------+----------+-----------+
|   insulin lispro,    | Inject 22 Units into |        |         |          |  |
| human, (HUMALOG) 100 |  the skin 3 (three)  |        |         |          | 8         |
|  UNIT/ML injection   | times daily before   |        |         |          |           |
|                      | meals.               |        |         |          |           |
+----------------------+----------------------+--------+---------+----------+-----------+
 as of this encounter
 
 Plan of Treatment
 Not on fileas of this encounter
 
 Procedures
 
 
 
+---------------------+--------+-------------+----------------------+----------------------+
| Procedure Name      | Priori | Date/Time   | Associated Diagnosis | Comments             |
|                     | ty     |             |                      |                      |
+---------------------+--------+-------------+----------------------+----------------------+
| MRI CERVICAL SPINE  | Routin | 2016  |   Diagnosis unknown  |   Results for this   |
| WO CONTRAST         | e      |  4:02 PM    |                      | procedure are in the |
|                     |        | PST         |                      |  results section.    |
+---------------------+--------+-------------+----------------------+----------------------+
 in this encounter
 
 Results
 MRI cervical spine without contrast (2016  4:02 PM)
 
+------------------------------------------------------------------------+--------------+
| Narrative                                                              | Performed At |
+------------------------------------------------------------------------+--------------+
|   This is a non-reportable procedure without a radiologist report and  |   [facility]     |
| is   used for image storage only                                       | RADIOLOGY    |
+------------------------------------------------------------------------+--------------+
 
 
 
+--------------------+------------------+--------------------+--------------+
| Performing         | Address          | City/State/Zipcode | Phone Number |
| Organization       |                  |                    |              |
+--------------------+------------------+--------------------+--------------+
|   [facility] |   [address] | [place] [address] |              |
+--------------------+------------------+--------------------+--------------+
 in this encounter
 
 Visit Diagnoses
 
 
+-----------------------------------------------------------------+
| Diagnosis                                                       |
+-----------------------------------------------------------------+
|   Diagnosis unknown                                             |
+-----------------------------------------------------------------+
|   Other unknown and unspecified cause of morbidity or mortality |
+-----------------------------------------------------------------+

## 2018-11-07 NOTE — XMS
Encounter Summary
  Created on: 2018
 
 Catrachito Elam
 External Reference #: [id number]
 : [birth year]
 Sex: Female
 
 Demographics
 
 
+-----------------------+---------------------------+
| Address               | [address]       |
|                       | [place]  [address] |
+-----------------------+---------------------------+
| Home Phone            | [phone]           |
+-----------------------+---------------------------+
| Preferred Language    | Unknown                   |
+-----------------------+---------------------------+
| Marital Status        | Single                    |
+-----------------------+---------------------------+
| Druze Affiliation | Unknown                   |
+-----------------------+---------------------------+
| Race                  | Unknown                   |
+-----------------------+---------------------------+
| Ethnic Group          | Unknown                   |
+-----------------------+---------------------------+
 
 
 Author
 
 
+--------------+-----------------------+
| Author       | [facility] |
+--------------+-----------------------+
| Organization | [facility] |
+--------------+-----------------------+
| Address      | Unknown               |
+--------------+-----------------------+
| Phone        | Unavailable           |
+--------------+-----------------------+
 
 
 
 Support
 
 
+--------------+--------------+---------+-----------------+
| Name         | Relationship | Address | Phone           |
+--------------+--------------+---------+-----------------+
| Renu Austin | ECON         | Unknown | [phone] |
+--------------+--------------+---------+-----------------+
 
 
 
 Care Team Providers
 
 
 
+------------------------+------+-----------------+
| Care Team Member Name  | Role | Phone           |
+------------------------+------+-----------------+
| Cassandra Teixeira PA-C | PCP  | [phone] |
+------------------------+------+-----------------+
 
 
 
 Encounter Details
 
 
+--------+------------+----------------------+-----------+-------------+
| Date   | Type       | Department           | Care Team | Description |
+--------+------------+----------------------+-----------+-------------+
| / | Procedure  |   [facility] PHYSICIAN     |           |             |
|    | Pass       | [facility] |           |             |
|        |            |  RADIOLOGY  [address]      |           |             |
|        |            | [address]           |           |             |
|        |            | [place] [address]   |           |             |
|        |            | [phone]         |           |             |
+--------+------------+----------------------+-----------+-------------+
 
 
 
 Social History
 
 
+--------------+-------+-----------+--------+------+
| Tobacco Use  | Types | Packs/Day | Years  | Date |
|              |       |           | Used   |      |
+--------------+-------+-----------+--------+------+
| Never Smoker |       |           |        |      |
+--------------+-------+-----------+--------+------+
 
 
 
+---------------------+---+---+---+
| Smokeless Tobacco:  |   |   |   |
| Never Used          |   |   |   |
+---------------------+---+---+---+
 
 
 
+------------------+---------------+
| Sex Assigned at  | Date Recorded |
| Birth            |               |
+------------------+---------------+
| Not on file      |               |
+------------------+---------------+
 as of this encounter
 
 Plan of Treatment
 Not on fileas of this encounter
 
 Visit Diagnoses
 Not on filein this encounter

## 2018-11-07 NOTE — XMS
Encounter Summary
  Created on: 2018
 
 Catrachito Elam
 External Reference #: [id number]
 : [birth year]
 Sex: Female
 
 Demographics
 
 
+-----------------------+---------------------------+
| Address               | [address]       |
|                       | [place]  [address] |
+-----------------------+---------------------------+
| Home Phone            | [phone]           |
+-----------------------+---------------------------+
| Preferred Language    | Unknown                   |
+-----------------------+---------------------------+
| Marital Status        | Single                    |
+-----------------------+---------------------------+
| Jainism Affiliation | Unknown                   |
+-----------------------+---------------------------+
| Race                  | Unknown                   |
+-----------------------+---------------------------+
| Ethnic Group          | Unknown                   |
+-----------------------+---------------------------+
 
 
 Author
 
 
+--------------+-----------------------+
| Author       | [facility] |
+--------------+-----------------------+
| Organization | [facility] |
+--------------+-----------------------+
| Address      | Unknown               |
+--------------+-----------------------+
| Phone        | Unavailable           |
+--------------+-----------------------+
 
 
 
 Support
 
 
+--------------+--------------+---------+-----------------+
| Name         | Relationship | Address | Phone           |
+--------------+--------------+---------+-----------------+
| Renu Austin | ECON         | Unknown | [phone] |
+--------------+--------------+---------+-----------------+
 
 
 
 Care Team Providers
 
 
 
+------------------------+------+-----------------+
| Care Team Member Name  | Role | Phone           |
+------------------------+------+-----------------+
| Cassandra Teixeira PA-C | PCP  | [phone] |
+------------------------+------+-----------------+
 
 
 
 Encounter Details
 
 
+--------+-------------+----------------------+----------------------+-------------+
| Date   | Type        | Department           | Care Team            | Description |
+--------+-------------+----------------------+----------------------+-------------+
| / | Documentati |   [facility]             |   Diego Villalobos,   |             |
| 2018   | on Only     | [facility]  | ARNP  [address]  |             |
|        |             |  [address]     | [address]  |             |
|        |             | [place]  | [place] [address]             |             |
|        |             | [address]           | [phone]         |             |
|        |             | [phone]         | [phone] (Fax)   |             |
+--------+-------------+----------------------+----------------------+-------------+
 
 
 
 Social History
 
 
+--------------+-------+-----------+--------+------+
| Tobacco Use  | Types | Packs/Day | Years  | Date |
|              |       |           | Used   |      |
+--------------+-------+-----------+--------+------+
| Never Smoker |       |           |        |      |
+--------------+-------+-----------+--------+------+
 
 
 
+---------------------+---+---+---+
| Smokeless Tobacco:  |   |   |   |
| Never Used          |   |   |   |
+---------------------+---+---+---+
 
 
 
+------------------+---------------+
| Sex Assigned at  | Date Recorded |
| Birth            |               |
+------------------+---------------+
| Not on file      |               |
+------------------+---------------+
 as of this encounter
 
 Progress Jacqui Chavesderon, Teodora RACHEL - 2018  2:22 PM PDTImaging reportsin this encounter
 
 Plan of Treatment
 Not on fileas of this encounter
 
 Visit Diagnoses
 Not on filein this encounter

## 2018-11-07 NOTE — XMS
Encounter Summary
  Created on: 2018
 
 Catrachito Block
 External Reference #: [id number]
 : [birth year]
 Sex: Female
 
 Demographics
 
 
+-----------------------+---------------------------+
| Address               | [address]       |
|                       | [place]  [address] |
+-----------------------+---------------------------+
| Home Phone            | [phone]           |
+-----------------------+---------------------------+
| Preferred Language    | Unknown                   |
+-----------------------+---------------------------+
| Marital Status        | Single                    |
+-----------------------+---------------------------+
| Hoahaoism Affiliation | Unknown                   |
+-----------------------+---------------------------+
| Race                  | Unknown                   |
+-----------------------+---------------------------+
| Ethnic Group          | Unknown                   |
+-----------------------+---------------------------+
 
 
 Author
 
 
+--------------+-----------------------+
| Author       | [facility] |
+--------------+-----------------------+
| Organization | [facility] |
+--------------+-----------------------+
| Address      | Unknown               |
+--------------+-----------------------+
| Phone        | Unavailable           |
+--------------+-----------------------+
 
 
 
 Support
 
 
+--------------+--------------+---------+-----------------+
| Name         | Relationship | Address | Phone           |
+--------------+--------------+---------+-----------------+
| Renu Austin | ECON         | Unknown | [phone] |
+--------------+--------------+---------+-----------------+
 
 
 
 Care Team Providers
 
 
 
+------------------------+------+-----------------+
| Care Team Member Name  | Role | Phone           |
+------------------------+------+-----------------+
| Cassandra Teixeira PA-C | PCP  | [phone] |
+------------------------+------+-----------------+
 
 
 
 Encounter Details
 
 
+--------+-----------+----------------------+----------------------+----------------------+
| Date   | Type      | Department           | Care Team            | Description          |
+--------+-----------+----------------------+----------------------+----------------------+
| / | Hospital  |   [facility]    |   Diego Villalobos,   | Cervicalgia;         |
| 2018   | Encounter | [facility]  | ARNP  [address]  | Numbness and         |
|        |           | Xray  [address]   | Dr [address]ccurdy,  | tingling of right    |
|        |           | [address]        | [place] [address]             | arm; Numbness and    |
|        |           | [place] [address]   | [phone]         | tingling in left arm |
|        |           | [phone]         | [phone] (Fax)   |                      |
+--------+-----------+----------------------+----------------------+----------------------+
 
 
 
 Social History
 
 
+--------------+-------+-----------+--------+------+
| Tobacco Use  | Types | Packs/Day | Years  | Date |
|              |       |           | Used   |      |
+--------------+-------+-----------+--------+------+
| Never Smoker |       |           |        |      |
+--------------+-------+-----------+--------+------+
 
 
 
+---------------------+---+---+---+
| Smokeless Tobacco:  |   |   |   |
| Never Used          |   |   |   |
+---------------------+---+---+---+
 
 
 
+------------------+---------------+
| Sex Assigned at  | Date Recorded |
| Birth            |               |
+------------------+---------------+
| Not on file      |               |
+------------------+---------------+
 as of this encounter
 
 Medications at Time of Discharge
 
 
+----------------------+----------------------+--------+---------+----------+-----------+
| Medication           | Sig.                 | Disp.  | Refills | Start    | End Date  |
|                      |                      |        |         | Date     |           |
+----------------------+----------------------+--------+---------+----------+-----------+
|   Cholecalciferol    | Take 1,000 Units by  |        |         |          |           |
| 1000 units capsule   | mouth daily.         |        |         |          |           |
 
+----------------------+----------------------+--------+---------+----------+-----------+
|   gabapentin         | Take 100 mg by mouth |        |         |          |           |
| (NEURONTIN) 100 MG   |  3 (three) times     |        |         |          |           |
| capsule              | daily.               |        |         |          |           |
+----------------------+----------------------+--------+---------+----------+-----------+
|   Insulin Pen Needle | 1 each by Does not   |   100  | 11      | 20 |           |
|  (PEN NEEDLES) 31G X | apply route daily.   | each   |         | 18       |           |
|  6 MM MISC           |                      |        |         |          |           |
+----------------------+----------------------+--------+---------+----------+-----------+
|   liraglutide        | Inject 0.6 mg into   |   3 mL | 11      | 20 |           |
| (VICTOZA) 18 MG/3ML  | the skin daily.      |        |         | 18       |           |
| injection            |                      |        |         |          |           |
+----------------------+----------------------+--------+---------+----------+-----------+
|   metFORMIN          | Take 2 tablets by    |   60   | 11      | 01/15/20 | 01/15/201 |
| (GLUCOPHAGE-XR) 500  | mouth daily with     | tablet |         | 18       | 9         |
| MG 24 hr tablet      | dinner.              |        |         |          |           |
+----------------------+----------------------+--------+---------+----------+-----------+
|   insulin detemir    | Inject 70 Units into |        |         |          |  |
| (LEVEMIR) 100        |  the skin nightly.   |        |         |          | 8         |
| UNIT/ML              |                      |        |         |          |           |
| injectionIndications |                      |        |         |          |           |
| : 30 units AM, 40    |                      |        |         |          |           |
| units PM             |                      |        |         |          |           |
+----------------------+----------------------+--------+---------+----------+-----------+
|   insulin lispro,    | Inject 22 Units into |        |         |          |  |
| human, (HUMALOG) 100 |  the skin 3 (three)  |        |         |          | 8         |
|  UNIT/ML injection   | times daily before   |        |         |          |           |
|                      | meals.               |        |         |          |           |
+----------------------+----------------------+--------+---------+----------+-----------+
 as of this encounter
 
 Plan of Treatment
 Not on fileas of this encounter
 
 Procedures
 
 
+--------------------+--------+-------------+----------------------+----------------------+
| Procedure Name     | Priori | Date/Time   | Associated Diagnosis | Comments             |
|                    | ty     |             |                      |                      |
+--------------------+--------+-------------+----------------------+----------------------+
| XR CERVICAL SPINE  | Routin | 2018  |   Cervicalgia        |   Results for this   |
| LIMITED 2-3 VIEW   | e      |  4:13 PM    | Numbness and         | procedure are in the |
|                    |        | PDT         | tingling of right    |  results section.    |
|                    |        |             | arm  Numbness and    |                      |
|                    |        |             | tingling in left arm |                      |
+--------------------+--------+-------------+----------------------+----------------------+
 in this encounter
 
 Results
 X-ray cervical spine limited 2-3 view (2018  4:13 PM)
 
+--------------------------------------------------------------------+--------------+
| Impressions                                                        | Performed At |
+--------------------------------------------------------------------+--------------+
|   1.    Mild to moderate multilevel degenerative change of the     |   [facility]     |
| cervical spine.     Electronically signed by Kaden Benjamin DO on  | RADIOLOGY    |
| 2018 4:58 PM                                                  |              |
+--------------------------------------------------------------------+--------------+
 
 
 
 
+------------------------------------------------------------------------+--------------+
| Narrative                                                              | Performed At |
+------------------------------------------------------------------------+--------------+
|   CATRACHITO NICHOLSONMAN  1975  42 years Female  XR CERVICAL SPINE       |   [facility]     |
| LIMITED 2-3 VIEW  2018 4:13 PM     INDICATION: Neck pain          | RADIOLOGY    |
| COMPARISON: None     TECHNIQUE: Lateral flexion-extension views of the |              |
|  cervical spine.     FINDINGS: Vertebral heights and alignment are     |              |
| maintained. Mild to moderate disc space narrowing with endplate        |              |
| spurring from C4 through T1. Prevertebral soft tissues are             |              |
| unremarkable. Negative for fracture. No evidence for instability on    |              |
| flexion and extension images.                                          |              |
+------------------------------------------------------------------------+--------------+
 
 
 
+-------------------------------------------------------------------------------------------
--------------------------------------------------------------------------------------------
---------------------------+
| Procedure Note                                                                            
                                                                                            
                           |
+-------------------------------------------------------------------------------------------
--------------------------------------------------------------------------------------------
---------------------------+
|   [place], Rad Results In - 2018  5:03 PM PDT  CATRACHITO BLOCK[birth year]21 years         
                                                                                            
                           |
| FemaleXR CERVICAL SPINE LIMITED 2-3 VIEW2018 4:13 PMINDICATION: Neck                 
                                                                                            
                           |
| painCOMPARISON: NoneTECHNIQUE: Lateral flexion-extension views of the cervical            
                                                                                            
                           |
| spine.FINDINGS: Vertebral heights and alignment are maintained. Mild to moderate disc     
                                                                                            
                           |
| space narrowing with endplate spurring from C4 through T1. Prevertebral soft tissues are  
                                                                                            
                           |
|  unremarkable. Negative for fracture. No evidence for instability on flexion and          
                                                                                            
                           |
| extension images.IMPRESSION:1.  Mild to moderate multilevel degenerative change of the    
                                                                                            
                           |
| cervical spine.Electronically signed by Kaden Benjamin DO on 2018 4:58 PM            
                                                                                            
                           |
|COMPARISON: None                                                                           
                                                                                            
                           |
|                                                                                           
                                                                                            
                           |
|TECHNIQUE: Lateral flexion-extension views of the cervical spine.                          
                                                                                            
                           |
|                                                                                           
 
                                                                                            
                           |
|FINDINGS: Vertebral heights and alignment are maintained. Mild to moderate disc space narro
wing with endplate spurring from C4 through T1. Prevertebral soft tissues are unremarkable. 
Negative for fracture. No  |
|evidence for instability on flexion and extension images.                                  
                                                                                            
                           |
|                                                                                           
                                                                                            
                           |
|IMPRESSION:                                                                                
                                                                                            
                          |
|1.  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                 
                                                                                            
                           |
|                                                                                           
                                                                                            
                           |
|Electronically signed by Kaden Benjamin DO on 2018 4:58 PM                            
                                                                                            
                           |
+-------------------------------------------------------------------------------------------
--------------------------------------------------------------------------------------------
---------------------------+
 
 
 
+--------------------+------------------+--------------------+--------------+
| Performing         | Address          | City/State/Zipcode | Phone Number |
| Organization       |                  |                    |              |
+--------------------+------------------+--------------------+--------------+
|   [facility] |   [address] | [place] [address] |              |
+--------------------+------------------+--------------------+--------------+
 in this encounter
 
 Visit Diagnoses
 
 
+--------------------------------------+
| Diagnosis                            |
+--------------------------------------+
|   Cervicalgia                        |
+--------------------------------------+
|   Numbness and tingling of right arm |
+--------------------------------------+
|   Disturbance of skin sensation      |
+--------------------------------------+
|   Numbness and tingling in left arm  |
+--------------------------------------+
|   Disturbance of skin sensation      |
+--------------------------------------+
 
 
 
 Admitting Diagnoses
 
 
+--------------------------------------+
 
| Diagnosis                            |
+--------------------------------------+
|   Cervicalgia                        |
+--------------------------------------+
|   Numbness and tingling in left arm  |
+--------------------------------------+
|   Disturbance of skin sensation      |
+--------------------------------------+
|   Numbness and tingling of right arm |
+--------------------------------------+
|   Disturbance of skin sensation      |
+--------------------------------------+

## 2018-11-07 NOTE — XMS
Encounter Summary
  Created on: 2018
 
 Catrachito lEam
 External Reference #: [id number]
 : [birth year]
 Sex: Female
 
 Demographics
 
 
+-----------------------+---------------------------+
| Address               | [address]       |
|                       | [place]  [address] |
+-----------------------+---------------------------+
| Home Phone            | [phone]           |
+-----------------------+---------------------------+
| Preferred Language    | Unknown                   |
+-----------------------+---------------------------+
| Marital Status        | Single                    |
+-----------------------+---------------------------+
| Church Affiliation | Unknown                   |
+-----------------------+---------------------------+
| Race                  | Unknown                   |
+-----------------------+---------------------------+
| Ethnic Group          | Unknown                   |
+-----------------------+---------------------------+
 
 
 Author
 
 
+--------------+-----------------------+
| Author       | [facility] |
+--------------+-----------------------+
| Organization | [facility] |
+--------------+-----------------------+
| Address      | Unknown               |
+--------------+-----------------------+
| Phone        | Unavailable           |
+--------------+-----------------------+
 
 
 
 Support
 
 
+--------------+--------------+---------+-----------------+
| Name         | Relationship | Address | Phone           |
+--------------+--------------+---------+-----------------+
| Renu Austin | ECON         | Unknown | [phone] |
+--------------+--------------+---------+-----------------+
 
 
 
 Care Team Providers
 
 
 
+------------------------+------+-----------------+
| Care Team Member Name  | Role | Phone           |
+------------------------+------+-----------------+
| Cassandra Teixeira PA-C | PCP  | [phone] |
+------------------------+------+-----------------+
 
 
 
 Reason for Visit
 
 
+--------+----------+
| Reason | Comments |
+--------+----------+
| Other  |          |
+--------+----------+
 
 
 
 Encounter Details
 
 
+--------+-----------+----------------------+--------------------+-------------+
| Date   | Type      | Department           | Care Team          | Description |
+--------+-----------+----------------------+--------------------+-------------+
| / | Telephone |   [facility]      |   Maria E Franklin,  | Other       |
| 2018   |           | Endocrinology  [address]  | [facility]                |             |
|        |           | [address]    |                    |             |
|        |           | [place]         |                    |             |
|        |           | [address]           |                    |             |
|        |           | [phone]         |                    |             |
+--------+-----------+----------------------+--------------------+-------------+
 
 
 
 Social History
 
 
+--------------+-------+-----------+--------+------+
| Tobacco Use  | Types | Packs/Day | Years  | Date |
|              |       |           | Used   |      |
+--------------+-------+-----------+--------+------+
| Never Smoker |       |           |        |      |
+--------------+-------+-----------+--------+------+
 
 
 
+---------------------+---+---+---+
| Smokeless Tobacco:  |   |   |   |
| Never Used          |   |   |   |
+---------------------+---+---+---+
 
 
 
+------------------+---------------+
| Sex Assigned at  | Date Recorded |
| Birth            |               |
+------------------+---------------+
| Not on file      |               |
+------------------+---------------+
 
 as of this encounter
 
 Plan of Treatment
 Not on fileas of this encounter
 
 Visit Diagnoses
 Not on filein this encounter

## 2018-11-07 NOTE — EKG
[facility]
                                    [address]
                                  [place]  [address]
_________________________________________________________________________________________
                                                                 Signed   
 
 
Normal sinus rhythm
Moderate voltage criteria for LVH, may be normal variant
Borderline ECG
No previous ECGs available
Confirmed by RUSTAM FUNEZ MD ([id number]) on 11/7/2018 9:17:23 PM
 
 
 
 
 
 
 
 
 
 
 
 
 
 
 
 
 
 
 
 
 
 
 
 
 
 
 
 
 
 
 
 
 
 
 
 
 
 
 
 
    Electronically Signed By: RUSTAM FUNEZ MD  11/07/18 2117
_________________________________________________________________________________________
PATIENT NAME:     SANG BLOCK                   
MEDICAL RECORD #: [id number]                     Electrocardiogram             
          ACCT #: [id number]  
DATE OF BIRTH:   [birth year]                                       
PHYSICIAN:   RUSTAM FUNEZ MD           REPORT #: [id number]
REPORT IS CONFIDENTIAL AND NOT TO BE RELEASED WITHOUT AUTHORIZATION

## 2018-11-07 NOTE — XMS
Encounter Summary
  Created on: 2018
 
 Catrachito Elam
 External Reference #: [id number]
 : [birth year]
 Sex: Female
 
 Demographics
 
 
+-----------------------+---------------------------+
| Address               | [address]       |
|                       | [place]  [address] |
+-----------------------+---------------------------+
| Home Phone            | [phone]           |
+-----------------------+---------------------------+
| Preferred Language    | Unknown                   |
+-----------------------+---------------------------+
| Marital Status        | Single                    |
+-----------------------+---------------------------+
| Sikh Affiliation | Unknown                   |
+-----------------------+---------------------------+
| Race                  | Unknown                   |
+-----------------------+---------------------------+
| Ethnic Group          | Unknown                   |
+-----------------------+---------------------------+
 
 
 Author
 
 
+--------------+-----------------------+
| Author       | [facility] [employer] |
+--------------+-----------------------+
| Organization | [facility] |
+--------------+-----------------------+
| Address      | Unknown               |
+--------------+-----------------------+
| Phone        | Unavailable           |
+--------------+-----------------------+
 
 
 
 Support
 
 
+--------------+--------------+---------+-----------------+
| Name         | Relationship | Address | Phone           |
+--------------+--------------+---------+-----------------+
| Renu Autsin | ECON         | Unknown | [phone] |
+--------------+--------------+---------+-----------------+
 
 
 
 Care Team Providers
 
 
 
+------------------------+------+-----------------+
| Care Team Member Name  | Role | Phone           |
+------------------------+------+-----------------+
| Cassandra Teixeira PA-C | PCP  | [phone] |
+------------------------+------+-----------------+
 
 
 
 Encounter Details
 
 
+--------+-----------+----------------------+--------------------+-------------+
| Date   | Type      | Department           | Care Team          | Description |
+--------+-----------+----------------------+--------------------+-------------+
| / | Telephone |   [facility]             |   [facility]  |             |
|    |           | [facility]  | CNA                |             |
|        |           |  [address]    |                    |             |
|        |           | [place]  [place], [place]  |                    |             |
|        |           | [address]           |                    |             |
|        |           | [phone]         |                    |             |
+--------+-----------+----------------------+--------------------+-------------+
 
 
 
 Social History
 
 
+--------------+-------+-----------+--------+------+
| Tobacco Use  | Types | Packs/Day | Years  | Date |
|              |       |           | Used   |      |
+--------------+-------+-----------+--------+------+
| Never Smoker |       |           |        |      |
+--------------+-------+-----------+--------+------+
 
 
 
+---------------------+---+---+---+
| Smokeless Tobacco:  |   |   |   |
| Never Used          |   |   |   |
+---------------------+---+---+---+
 
 
 
+------------------+---------------+
| Sex Assigned at  | Date Recorded |
| Birth            |               |
+------------------+---------------+
| Not on file      |               |
+------------------+---------------+
 as of this encounter
 
 Plan of Treatment
 Not on fileas of this encounter
 
 Visit Diagnoses
 Not on filein this encounter

## 2018-11-07 NOTE — XMS
Encounter Summary
  Created on: 2018
 
 Catrachito Elam
 External Reference #: [id number]
 : [birth year]
 Sex: Female
 
 Demographics
 
 
+-----------------------+---------------------------+
| Address               | [address]       |
|                       | [place]  [address] |
+-----------------------+---------------------------+
| Home Phone            | [phone]           |
+-----------------------+---------------------------+
| Preferred Language    | Unknown                   |
+-----------------------+---------------------------+
| Marital Status        | Single                    |
+-----------------------+---------------------------+
| Faith Affiliation | Unknown                   |
+-----------------------+---------------------------+
| Race                  | Unknown                   |
+-----------------------+---------------------------+
| Ethnic Group          | Unknown                   |
+-----------------------+---------------------------+
 
 
 Author
 
 
+--------------+-----------------------+
| Author       | [facility] |
+--------------+-----------------------+
| Organization | [facility] |
+--------------+-----------------------+
| Address      | Unknown               |
+--------------+-----------------------+
| Phone        | Unavailable           |
+--------------+-----------------------+
 
 
 
 Support
 
 
+--------------+--------------+---------+-----------------+
| Name         | Relationship | Address | Phone           |
+--------------+--------------+---------+-----------------+
| Renu Austin | ECON         | Unknown | [phone] |
+--------------+--------------+---------+-----------------+
 
 
 
 Care Team Providers
 
 
 
+------------------------+------+-----------------+
| Care Team Member Name  | Role | Phone           |
+------------------------+------+-----------------+
| Cassandra Teixeira PA-C | PCP  | [phone] |
+------------------------+------+-----------------+
 
 
 
 Reason for Referral
 Physical Medicine (Routine)
 
+------------+--------------+-----------+--------------+--------------+---------------+
| Status     | Reason       | Specialty | Diagnoses /  | Referred By  | Referred To   |
|            |              |           | Procedures   | Contact      | Contact       |
+------------+--------------+-----------+--------------+--------------+---------------+
| Authorized |   Specialty  | Physical  |   Diagnoses  |   [facility],   |   Therapy,    |
|            | Services     | Therapy   |  Cervicalgia | [facility] | [facility]       |
|            | Required     |           |              |   [address]       | [place]        |
|            |              |           |              | [address]  | Physical      |
|            |              |           |              | [address]        | [address]          |
|            |              |           |              | [place], [place] | [address] |
|            |              |           |              |  [id number]       |   [place],  |
|            |              |           |              | Phone:       | [place] [address]      |
|            |              |           |              | [phone] | Phone:        |
|            |              |           |              |   Fax:       | [phone]  |
|            |              |           |              | [phone] |  Fax:         |
|            |              |           |              |              | [phone]  |
+------------+--------------+-----------+--------------+--------------+---------------+
 
 
 
 
 Encounter Details
 
 
+--------+-------------+----------------------+----------------------+----------------------
+
| Date   | Type        | Department           | Care Team            | Description          
|
+--------+-------------+----------------------+----------------------+----------------------
+
| / | Orders Only |   [facility]             |   [facility],  | Cervicalgia (Primary 
|
| 2018   |             | [facility]  | CMA                  |  Dx)                 
|
|        |             |  [address]    |                      |                      
|
|        |             | [place]  [place]  |                      |                      
|
|        |             | [address]           |                      |                      
|
|        |             | [phone]         |                      |                      
|
+--------+-------------+----------------------+----------------------+----------------------
+
 
 
 
 Social History
 
 
 
+--------------+-------+-----------+--------+------+
| Tobacco Use  | Types | Packs/Day | Years  | Date |
|              |       |           | Used   |      |
+--------------+-------+-----------+--------+------+
| Never Smoker |       |           |        |      |
+--------------+-------+-----------+--------+------+
 
 
 
+---------------------+---+---+---+
| Smokeless Tobacco:  |   |   |   |
| Never Used          |   |   |   |
+---------------------+---+---+---+
 
 
 
+------------------+---------------+
| Sex Assigned at  | Date Recorded |
| Birth            |               |
+------------------+---------------+
| Not on file      |               |
+------------------+---------------+
 as of this encounter
 
 Plan of Treatment
 
 
+----------------------------------+--------+----------------------+---------------------+
| Name                             | Priori | Associated Diagnoses | Order Schedule      |
|                                  | ty     |                      |                     |
+----------------------------------+--------+----------------------+---------------------+
| Ambulatory referral to Physical  | Routin |   Cervicalgia        | Ordered: 2018 |
| Therapy, Eval and Treat          | e      |                      |                     |
+----------------------------------+--------+----------------------+---------------------+
 as of this encounter
 
 Visit Diagnoses
 
 
+-------------------------+
| Diagnosis               |
+-------------------------+
|   Cervicalgia - Primary |
+-------------------------+

## 2018-11-07 NOTE — XMS
Encounter Summary
  Created on: 2018
 
 Catrachito Elam
 External Reference #: [id number]
 : [birth year]
 Sex: Female
 
 Demographics
 
 
+-----------------------+---------------------------+
| Address               | [address]       |
|                       | [place]  [address] |
+-----------------------+---------------------------+
| Home Phone            | [phone]           |
+-----------------------+---------------------------+
| Preferred Language    | Unknown                   |
+-----------------------+---------------------------+
| Marital Status        | Single                    |
+-----------------------+---------------------------+
| Restorationism Affiliation | Unknown                   |
+-----------------------+---------------------------+
| Race                  | Unknown                   |
+-----------------------+---------------------------+
| Ethnic Group          | Unknown                   |
+-----------------------+---------------------------+
 
 
 Author
 
 
+--------------+-----------------------+
| Author       | [facility] [employer] |
+--------------+-----------------------+
| Organization | [email or web address] |
+--------------+-----------------------+
| Address      | Unknown               |
+--------------+-----------------------+
| Phone        | Unavailable           |
+--------------+-----------------------+
 
 
 
 Support
 
 
+--------------+--------------+---------+-----------------+
| Name         | Relationship | Address | Phone           |
+--------------+--------------+---------+-----------------+
| Renu Austin | ECON         | Unknown | [phone] |
+--------------+--------------+---------+-----------------+
 
 
 
 Care Team Providers
 
 
 
+------------------------+------+-----------------+
| Care Team Member Name  | Role | Phone           |
+------------------------+------+-----------------+
| Cassandra Teixeira PA-C | PCP  | [phone] |
+------------------------+------+-----------------+
 
 
 
 Reason for Visit
 
 
+---------+-------------------------------------------------------------------+
| Reason  | Comments                                                          |
+---------+-------------------------------------------------------------------+
| Imaging | pt is requesting to speak to the MA, pt has her CT scan and xray  |
|         | info from last time she has them done.                            |
+---------+-------------------------------------------------------------------+
 
 
 
 Encounter Details
 
 
+--------+-----------+----------------------+----------------------+----------------------+
| Date   | Type      | Department           | Care Team            | Description          |
+--------+-----------+----------------------+----------------------+----------------------+
| / | Telephone |   [email or web address]             |   Diego Villalobos,   | Imaging (pt is       |
| 2018   |           | [facility]  | ARNP  [address]  | requesting to speak  |
|        |           |  [address]    | Dr Mccurdy,  | to the MA, pt has    |
|        |           | [place]  | [place] [address]             | her CT scan and xray |
|        |           | [address]           | [phone]         |  info from last time |
|        |           | [phone]         | [phone] (Fax)   |  she has them done.  |
|        |           |                      |                      | )                    |
+--------+-----------+----------------------+----------------------+----------------------+
 
 
 
 Social History
 
 
+--------------+-------+-----------+--------+------+
| Tobacco Use  | Types | Packs/Day | Years  | Date |
|              |       |           | Used   |      |
+--------------+-------+-----------+--------+------+
| Never Smoker |       |           |        |      |
+--------------+-------+-----------+--------+------+
 
 
 
+---------------------+---+---+---+
| Smokeless Tobacco:  |   |   |   |
| Never Used          |   |   |   |
+---------------------+---+---+---+
 
 
 
+------------------+---------------+
| Sex Assigned at  | Date Recorded |
| Birth            |               |
+------------------+---------------+
 
| Not on file      |               |
+------------------+---------------+
 as of this encounter
 
 Plan of Treatment
 Not on fileas of this encounter
 
 Visit Diagnoses
 Not on filein this encounter

## 2018-11-07 NOTE — XMS
Clinical Summary
  Created on: 2018
 
 Catrachito Elam
 External Reference #: [id number]
 : [birth year]
 Sex: Female
 
 Demographics
 
 
+-----------------------+-------------------------+
| Address               | [address]  |
|                       | [place]  [address]     |
+-----------------------+-------------------------+
| Home Phone            | [phone]         |
+-----------------------+-------------------------+
| Preferred Language    | Unknown                 |
+-----------------------+-------------------------+
| Marital Status        | Unknown                 |
+-----------------------+-------------------------+
| Temple Affiliation | Unknown                 |
+-----------------------+-------------------------+
| Race                  | Unknown                 |
+-----------------------+-------------------------+
| Ethnic Group          | Unknown                 |
+-----------------------+-------------------------+
 
 
 Author
 
 
+--------------+--------------------------+
| Author       | [facility] |
+--------------+--------------------------+
| Organization | [facility] |
+--------------+--------------------------+
| Address      | Unknown                  |
+--------------+--------------------------+
| Phone        | Unavailable              |
+--------------+--------------------------+
 
 
 
 Care Team Providers
 
 
+-----------------------+------+-------------+
| Care Team Member Name | Role | Phone       |
+-----------------------+------+-------------+
 PP   | Unavailable |
+-----------------------+------+-------------+
 
 
 
 Source Comments
 MASSIEL is fully live on both [facility] and [facility]
 
 
 Allergies
 Not on File
 
 Current Medications
 Not on file
 
 Active Problems
 Not on file
 
 Social History
 
 
+----------------+-------+-----------+--------+------+
| Tobacco Use    | Types | Packs/Day | Years  | Date |
|                |       |           | Used   |      |
+----------------+-------+-----------+--------+------+
| Never Assessed |       |           |        |      |
+----------------+-------+-----------+--------+------+
 
 
 
+------------------+---------------+
| Sex Assigned at  | Date Recorded |
| Birth            |               |
+------------------+---------------+
| Not on file      |               |
+------------------+---------------+
 
 
 
 Plan of Treatment
 
 
+--------------------+-----------+------------+----------+
| Health Maintenance | Due Date  | Last Done  | Comments |
+--------------------+-----------+------------+----------+
| Influenza (Flu)    |  | 2015 |          |
| vaccination (#1)   | 8         |            |          |
+--------------------+-----------+------------+----------+
 
 
 
 Results
 Not on filefrom Last 3 Months
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
## 2018-11-07 NOTE — XMS
Encounter Summary
  Created on: 2018
 
 Catrachito Elam
 External Reference #: [id number]
 : [birth year]
 Sex: Female
 
 Demographics
 
 
+-----------------------+---------------------------+
| Address               | [address]       |
|                       | [place]  [address] |
+-----------------------+---------------------------+
| Home Phone            | [phone]           |
+-----------------------+---------------------------+
| Preferred Language    | Unknown                   |
+-----------------------+---------------------------+
| Marital Status        | Single                    |
+-----------------------+---------------------------+
| Mosque Affiliation | Unknown                   |
+-----------------------+---------------------------+
| Race                  | Unknown                   |
+-----------------------+---------------------------+
| Ethnic Group          | Unknown                   |
+-----------------------+---------------------------+
 
 
 Author
 
 
+--------------+-----------------------+
| Author       | [facility] |
+--------------+-----------------------+
| Organization | [facility] |
+--------------+-----------------------+
| Address      | Unknown               |
+--------------+-----------------------+
| Phone        | Unavailable           |
+--------------+-----------------------+
 
 
 
 Support
 
 
+--------------+--------------+---------+-----------------+
| Name         | Relationship | Address | Phone           |
+--------------+--------------+---------+-----------------+
| Renu Austin | ECON         | Unknown | [phone] |
+--------------+--------------+---------+-----------------+
 
 
 
 Care Team Providers
 
 
 
+------------------------+------+-----------------+
| Care Team Member Name  | Role | Phone           |
+------------------------+------+-----------------+
| Cassandra Teixeira PA-C | PCP  | [phone] |
+------------------------+------+-----------------+
 
 
 
 Reason for Visit
 MRI/CAT Scan (Routine)
 
+----------+--------+-----------+--------------+--------------+--------------+
| Status   | Reason | Specialty | Diagnoses /  | Referred By  | Referred To  |
|          |        |           | Procedures   | Contact      | Contact      |
+----------+--------+-----------+--------------+--------------+--------------+
| Pending  |        | Radiology |   Diagnoses  |   See,       |              |
| Review   |        |           |  Diagnosis   | Medical      |              |
|          |        |           | unknown      | Record  [address] |              |
|          |        |           | Procedures   |  [place]   |              |
|          |        |           | MRI brain    | avenue       |              |
|          |        |           | without      | [address] [address]   |              |
|          |        |           | contrast     | [id number]        |              |
|          |        |           |              | Phone:       |              |
|          |        |           |              | [phone] |              |
|          |        |           |              |   Fax:       |              |
|          |        |           |              | [phone] |              |
+----------+--------+-----------+--------------+--------------+--------------+
 
 
 
 
 Encounter Details
 
 
+--------+-----------+----------------------+----------------------+-------------------+
| Date   | Type      | Department           | Care Team            | Description       |
+--------+-----------+----------------------+----------------------+-------------------+
| / | Hospital  |   [facility] PHYSICIAN     |   See, Medical       | Diagnosis unknown |
|    | Encounter | [facility] | Record  [address]   |                   |
|        |           |  RADIOLOGY  [address]      | [address] |                   |
|        |           | [facility]           |  [place] [address]            |                   |
|        |           | [place] [address]   | [phone]         |                   |
|        |           | [phone]         | [phone] (Fax)   |                   |
+--------+-----------+----------------------+----------------------+-------------------+
 
 
 
 Social History
 
 
+--------------+-------+-----------+--------+------+
| Tobacco Use  | Types | Packs/Day | Years  | Date |
|              |       |           | Used   |      |
+--------------+-------+-----------+--------+------+
| Never Smoker |       |           |        |      |
+--------------+-------+-----------+--------+------+
 
 
 
+---------------------+---+---+---+
 
| Smokeless Tobacco:  |   |   |   |
| Never Used          |   |   |   |
+---------------------+---+---+---+
 
 
 
+------------------+---------------+
| Sex Assigned at  | Date Recorded |
| Birth            |               |
+------------------+---------------+
| Not on file      |               |
+------------------+---------------+
 as of this encounter
 
 Medications at Time of Discharge
 
 
+----------------------+----------------------+--------+---------+----------+-----------+
| Medication           | Sig.                 | Disp.  | Refills | Start    | End Date  |
|                      |                      |        |         | Date     |           |
+----------------------+----------------------+--------+---------+----------+-----------+
|   Cholecalciferol    | Take 1,000 Units by  |        |         |          |           |
| 1000 units capsule   | mouth daily.         |        |         |          |           |
+----------------------+----------------------+--------+---------+----------+-----------+
|   gabapentin         | Take 100 mg by mouth |        |         |          |           |
| (NEURONTIN) 100 MG   |  3 (three) times     |        |         |          |           |
| capsule              | daily.               |        |         |          |           |
+----------------------+----------------------+--------+---------+----------+-----------+
|   Insulin Pen Needle | 1 each by Does not   |   100  | 11      | 20 |           |
|  (PEN NEEDLES) 31G X | apply route daily.   | each   |         | 18       |           |
|  6 MM MISC           |                      |        |         |          |           |
+----------------------+----------------------+--------+---------+----------+-----------+
|   liraglutide        | Inject 0.6 mg into   |   3 mL | 11      | 20 |           |
| (VICTOZA) 18 MG/3ML  | the skin daily.      |        |         | 18       |           |
| injection            |                      |        |         |          |           |
+----------------------+----------------------+--------+---------+----------+-----------+
|   metFORMIN          | Take 2 tablets by    |   60   | 11      | 01/15/20 | 01/15/201 |
| (GLUCOPHAGE-XR) 500  | mouth daily with     | tablet |         | 18       | 9         |
| MG 24 hr tablet      | dinner.              |        |         |          |           |
+----------------------+----------------------+--------+---------+----------+-----------+
|   insulin detemir    | Inject 70 Units into |        |         |          |  |
| (LEVEMIR) 100        |  the skin nightly.   |        |         |          | 8         |
| UNIT/ML              |                      |        |         |          |           |
| injectionIndications |                      |        |         |          |           |
| : 30 units AM, 40    |                      |        |         |          |           |
| units PM             |                      |        |         |          |           |
+----------------------+----------------------+--------+---------+----------+-----------+
|   insulin lispro,    | Inject 22 Units into |        |         |          |  |
| human, (HUMALOG) 100 |  the skin 3 (three)  |        |         |          | 8         |
|  UNIT/ML injection   | times daily before   |        |         |          |           |
|                      | meals.               |        |         |          |           |
+----------------------+----------------------+--------+---------+----------+-----------+
 as of this encounter
 
 Plan of Treatment
 Not on fileas of this encounter
 
 Procedures
 
 
 
+----------------+--------+-------------+----------------------+----------------------+
| Procedure Name | Priori | Date/Time   | Associated Diagnosis | Comments             |
|                | ty     |             |                      |                      |
+----------------+--------+-------------+----------------------+----------------------+
| MRI BRAIN WO   | Routin | 2016  |   Diagnosis unknown  |   Results for this   |
| CONTRAST       | e      |  4:04 PM    |                      | procedure are in the |
|                |        | PST         |                      |  results section.    |
+----------------+--------+-------------+----------------------+----------------------+
 in this encounter
 
 Results
 MRI brain without contrast (2016  4:04 PM)
 
+------------------------------------------------------------------------+--------------+
| Narrative                                                              | Performed At |
+------------------------------------------------------------------------+--------------+
|   This is a non-reportable procedure without a radiologist report and  |   [facility]     |
| is   used for image storage only                                       | RADIOLOGY    |
+------------------------------------------------------------------------+--------------+
 
 
 
+--------------------+------------------+--------------------+--------------+
| Performing         | Address          | City/State/Zipcode | Phone Number |
| Organization       |                  |                    |              |
+--------------------+------------------+--------------------+--------------+
|   [facility] |   [address] | [place] [address] |              |
+--------------------+------------------+--------------------+--------------+
 in this encounter
 
 Visit Diagnoses
 
 
+-----------------------------------------------------------------+
| Diagnosis                                                       |
+-----------------------------------------------------------------+
|   Diagnosis unknown                                             |
+-----------------------------------------------------------------+
|   Other unknown and unspecified cause of morbidity or mortality |
+-----------------------------------------------------------------+

## 2018-11-07 NOTE — XMS
Encounter Summary
  Created on: 2018
 
 Catrachito Elam
 External Reference #: [id number]
 : [birth year]
 Sex: Female
 
 Demographics
 
 
+-----------------------+---------------------------+
| Address               | [address]       |
|                       | [place]  [address] |
+-----------------------+---------------------------+
| Home Phone            | [phone]           |
+-----------------------+---------------------------+
| Preferred Language    | Unknown                   |
+-----------------------+---------------------------+
| Marital Status        | Single                    |
+-----------------------+---------------------------+
| Synagogue Affiliation | Unknown                   |
+-----------------------+---------------------------+
| Race                  | Unknown                   |
+-----------------------+---------------------------+
| Ethnic Group          | Unknown                   |
+-----------------------+---------------------------+
 
 
 Author
 
 
+--------------+-----------------------+
| Author       | [facility] [employer] |
+--------------+-----------------------+
| Organization | [facility] |
+--------------+-----------------------+
| Address      | Unknown               |
+--------------+-----------------------+
| Phone        | Unavailable           |
+--------------+-----------------------+
 
 
 
 Support
 
 
+--------------+--------------+---------+-----------------+
| Name         | Relationship | Address | Phone           |
+--------------+--------------+---------+-----------------+
| Renu Austin | ECON         | Unknown | [phone] |
+--------------+--------------+---------+-----------------+
 
 
 
 Care Team Providers
 
 
 
+------------------------+------+-----------------+
| Care Team Member Name  | Role | Phone           |
+------------------------+------+-----------------+
| Cassandra Teixeira PA-C | PCP  | [phone] |
+------------------------+------+-----------------+
 
 
 
 Reason for Visit
 
 
+-------------------+------------------------------------------------------------------+
| Reason            | Comments                                                         |
+-------------------+------------------------------------------------------------------+
| Medication Refill | Pen Rx refill request. Also pt only has  3 doses of her insulin  |
|                   | left.                                                            |
+-------------------+------------------------------------------------------------------+
 
 
 
 Encounter Details
 
 
+--------+--------+----------------------+----------------------+-------------+
| Date   | Type   | Department           | Care Team            | Description |
+--------+--------+----------------------+----------------------+-------------+
| 10/30/ | Refill |   [facility]      |   Brodie Celaya,  |             |
|    |        | [address]  | MD  [address]    |             |
|        |        | [address]    | [facility] [address]         |             |
|        |        | [place], [place]         | [place] [address]   |             |
|        |        | [address]           | [phone]         |             |
|        |        | [phone]         | [phone] (Fax)   |             |
+--------+--------+----------------------+----------------------+-------------+
 
 
 
 Social History
 
 
+--------------+-------+-----------+--------+------+
| Tobacco Use  | Types | Packs/Day | Years  | Date |
|              |       |           | Used   |      |
+--------------+-------+-----------+--------+------+
| Never Smoker |       |           |        |      |
+--------------+-------+-----------+--------+------+
 
 
 
+---------------------+---+---+---+
| Smokeless Tobacco:  |   |   |   |
| Never Used          |   |   |   |
+---------------------+---+---+---+
 
 
 
+------------------+---------------+
| Sex Assigned at  | Date Recorded |
| Birth            |               |
+------------------+---------------+
| Not on file      |               |
 
+------------------+---------------+
 as of this encounter
 
 Plan of Treatment
 Not on fileas of this encounter
 
 Visit Diagnoses
 Not on filein this encounter

## 2018-11-07 NOTE — XMS
Encounter Summary
  Created on: 2018
 
 Catrachito Elam
 External Reference #: [id number]
 : [birth year]
 Sex: Female
 
 Demographics
 
 
+-----------------------+---------------------------+
| Address               | [address]       |
|                       | [place]  [address] |
+-----------------------+---------------------------+
| Home Phone            | [phone]           |
+-----------------------+---------------------------+
| Preferred Language    | Unknown                   |
+-----------------------+---------------------------+
| Marital Status        | Single                    |
+-----------------------+---------------------------+
| Buddhist Affiliation | Unknown                   |
+-----------------------+---------------------------+
| Race                  | Unknown                   |
+-----------------------+---------------------------+
| Ethnic Group          | Unknown                   |
+-----------------------+---------------------------+
 
 
 Author
 
 
+--------------+-----------------------+
| Author       | [employer] |
+--------------+-----------------------+
| Organization | [facility] |
+--------------+-----------------------+
| Address      | Unknown               |
+--------------+-----------------------+
| Phone        | Unavailable           |
+--------------+-----------------------+
 
 
 
 Support
 
 
+--------------+--------------+---------+-----------------+
| Name         | Relationship | Address | Phone           |
+--------------+--------------+---------+-----------------+
| Renu Austin | ECON         | Unknown | [phone] |
+--------------+--------------+---------+-----------------+
 
 
 
 Care Team Providers
 
 
 
+------------------------+------+-----------------+
| Care Team Member Name  | Role | Phone           |
+------------------------+------+-----------------+
| Cassandra Teixeira PA-C | PCP  | [phone] |
+------------------------+------+-----------------+
 
 
 
 Reason for Visit
 MRI/CAT Scan (Routine)
 
+----------+--------+-----------+--------------+--------------+--------------+
| Status   | Reason | Specialty | Diagnoses /  | Referred By  | Referred To  |
|          |        |           | Procedures   | Contact      | Contact      |
+----------+--------+-----------+--------------+--------------+--------------+
| Pending  |        | Radiology |   Diagnoses  |   See,       |              |
| Review   |        |           |  Diagnosis   | Medical      |              |
|          |        |           | unknown      | Record  [address] |              |
|          |        |           | Procedures   |  [place]   |              |
|          |        |           | MRI cervical | [address]       |              |
|          |        |           |  spine       | [address]   |              |
|          |        |           | without      | [address]        |              |
|          |        |           | contrast     | Phone:       |              |
|          |        |           |              | [phone] |              |
|          |        |           |              |   Fax:       |              |
|          |        |           |              | [phone] |              |
+----------+--------+-----------+--------------+--------------+--------------+
 
 
 
 
 Encounter Details
 
 
+--------+-----------+----------------------+----------------------+-------------------+
| Date   | Type      | Department           | Care Team            | Description       |
+--------+-----------+----------------------+----------------------+-------------------+
| / | Hospital  |   [facility]YSICIAN     |   See, Medical       | Diagnosis unknown |
|    | Encounter | [facility] | Record  [address]   |                   |
|        |           |  RADIOLOGY  [address]      | [address] |                   |
|        |           | [facility]           |  [place] [address]            |                   |
|        |           | [place] [address]   | [phone]         |                   |
|        |           | [phone]         | [phone] (Fax)   |                   |
+--------+-----------+----------------------+----------------------+-------------------+
 
 
 
 Social History
 
 
+--------------+-------+-----------+--------+------+
| Tobacco Use  | Types | Packs/Day | Years  | Date |
|              |       |           | Used   |      |
+--------------+-------+-----------+--------+------+
| Never Smoker |       |           |        |      |
+--------------+-------+-----------+--------+------+
 
 
 
+---------------------+---+---+---+
 
| Smokeless Tobacco:  |   |   |   |
| Never Used          |   |   |   |
+---------------------+---+---+---+
 
 
 
+------------------+---------------+
| Sex Assigned at  | Date Recorded |
| Birth            |               |
+------------------+---------------+
| Not on file      |               |
+------------------+---------------+
 as of this encounter
 
 Medications at Time of Discharge
 
 
+----------------------+----------------------+--------+---------+----------+-----------+
| Medication           | Sig.                 | Disp.  | Refills | Start    | End Date  |
|                      |                      |        |         | Date     |           |
+----------------------+----------------------+--------+---------+----------+-----------+
|   Cholecalciferol    | Take 1,000 Units by  |        |         |          |           |
| 1000 units capsule   | mouth daily.         |        |         |          |           |
+----------------------+----------------------+--------+---------+----------+-----------+
|   gabapentin         | Take 100 mg by mouth |        |         |          |           |
| (NEURONTIN) 100 MG   |  3 (three) times     |        |         |          |           |
| capsule              | daily.               |        |         |          |           |
+----------------------+----------------------+--------+---------+----------+-----------+
|   Insulin Pen Needle | 1 each by Does not   |   100  | 11      | 20 |           |
|  (PEN NEEDLES) 31G X | apply route daily.   | each   |         | 18       |           |
|  6 MM MISC           |                      |        |         |          |           |
+----------------------+----------------------+--------+---------+----------+-----------+
|   liraglutide        | Inject 0.6 mg into   |   3 mL | 11      | 20 |           |
| (VICTOZA) 18 MG/3ML  | the skin daily.      |        |         | 18       |           |
| injection            |                      |        |         |          |           |
+----------------------+----------------------+--------+---------+----------+-----------+
|   metFORMIN          | Take 2 tablets by    |   60   | 11      | 01/15/20 | 01/15/201 |
| (GLUCOPHAGE-XR) 500  | mouth daily with     | tablet |         | 18       | 9         |
| MG 24 hr tablet      | dinner.              |        |         |          |           |
+----------------------+----------------------+--------+---------+----------+-----------+
|   insulin detemir    | Inject 70 Units into |        |         |          |  |
| (LEVEMIR) 100        |  the skin nightly.   |        |         |          | 8         |
| UNIT/ML              |                      |        |         |          |           |
| injectionIndications |                      |        |         |          |           |
| : 30 units AM, 40    |                      |        |         |          |           |
| units PM             |                      |        |         |          |           |
+----------------------+----------------------+--------+---------+----------+-----------+
|   insulin lispro,    | Inject 22 Units into |        |         |          |  |
| human, (HUMALOG) 100 |  the skin 3 (three)  |        |         |          | 8         |
|  UNIT/ML injection   | times daily before   |        |         |          |           |
|                      | meals.               |        |         |          |           |
+----------------------+----------------------+--------+---------+----------+-----------+
 as of this encounter
 
 Plan of Treatment
 Not on fileas of this encounter
 
 Procedures
 
 
 
+---------------------+--------+-------------+----------------------+----------------------+
| Procedure Name      | Priori | Date/Time   | Associated Diagnosis | Comments             |
|                     | ty     |             |                      |                      |
+---------------------+--------+-------------+----------------------+----------------------+
| MRI CERVICAL SPINE  | Routin | 2016  |   Diagnosis unknown  |   Results for this   |
| WO CONTRAST         | e      |  4:02 PM    |                      | procedure are in the |
|                     |        | PST         |                      |  results section.    |
+---------------------+--------+-------------+----------------------+----------------------+
 in this encounter
 
 Results
 MRI cervical spine without contrast (2016  4:02 PM)
 
+------------------------------------------------------------------------+--------------+
| Narrative                                                              | Performed At |
+------------------------------------------------------------------------+--------------+
|   This is a non-reportable procedure without a radiologist report and  |   [facility]     |
| is   used for image storage only                                       | RADIOLOGY    |
+------------------------------------------------------------------------+--------------+
 
 
 
+--------------------+------------------+--------------------+--------------+
| Performing         | Address          | City/State/Zipcode | Phone Number |
| Organization       |                  |                    |              |
+--------------------+------------------+--------------------+--------------+
|   [facility] |   [address] | [place] [address] |              |
+--------------------+------------------+--------------------+--------------+
 in this encounter
 
 Visit Diagnoses
 
 
+-----------------------------------------------------------------+
| Diagnosis                                                       |
+-----------------------------------------------------------------+
|   Diagnosis unknown                                             |
+-----------------------------------------------------------------+
|   Other unknown and unspecified cause of morbidity or mortality |
+-----------------------------------------------------------------+

## 2018-11-07 NOTE — XMS
Clinical Summary
  Created on: 2018
 
 Catrachito Block
 External Reference #: [id number]
 : [birth year]
 Sex: Female
 
 Demographics
 
 
+-----------------------+---------------------------+
| Address               | [address]       |
|                       | [place]  [address] |
+-----------------------+---------------------------+
| Home Phone            | [phone]           |
+-----------------------+---------------------------+
| Preferred Language    | Unknown                   |
+-----------------------+---------------------------+
| Marital Status        | Single                    |
+-----------------------+---------------------------+
| Bahai Affiliation | Unknown                   |
+-----------------------+---------------------------+
| Race                  | Unknown                   |
+-----------------------+---------------------------+
| Ethnic Group          | Unknown                   |
+-----------------------+---------------------------+
 
 
 Author
 
 
+--------------+-----------------------+
| Author       | [facility] |
+--------------+-----------------------+
| Organization | [facility] |
+--------------+-----------------------+
| Address      | Unknown               |
+--------------+-----------------------+
| Phone        | Unavailable           |
+--------------+-----------------------+
 
 
 
 Support
 
 
+--------------+--------------+---------+-----------------+
| Name         | Relationship | Address | Phone           |
+--------------+--------------+---------+-----------------+
| Renu Austin | ECON         | Unknown | [phone] |
+--------------+--------------+---------+-----------------+
 
 
 
 Care Team Providers
 
 
 
+------------------------+------+-----------------+
| Care Team Member Name  | Role | Phone           |
+------------------------+------+-----------------+
| Cassandra Teixeira PA-C | PP   | [phone] |
+------------------------+------+-----------------+
 
 
 
 Allergies
 
 
+----------------+---------------------+----------+----------+----------+
| Active Allergy | Reactions           | Severity | Noted    | Comments |
|                |                     |          | Date     |          |
+----------------+---------------------+----------+----------+----------+
| Erythromycin   | Nausea and Vomiting | Low      | 01/15/20 |          |
|                |                     |          | 18       |          |
+----------------+---------------------+----------+----------+----------+
| Latex          | Hives               | High     | 10/21/20 |          |
|                |                     |          | 16       |          |
+----------------+---------------------+----------+----------+----------+
| Lisinopril     | Cough               | Low      | 01/15/20 |          |
|                |                     |          | 18       |          |
+----------------+---------------------+----------+----------+----------+
 
 
 
 Current Medications
 
 
+----------------------+----------------------+---------+---------+------+------+-------+
| Prescription         | Sig.                 | Disp.   | Refills | Star | End  | Statu |
|                      |                      |         |         | t    | Date | s     |
|                      |                      |         |         | Date |      |       |
+----------------------+----------------------+---------+---------+------+------+-------+
|   gabapentin         | Take 100 mg by mouth |         |         |      |      | Activ |
| (NEURONTIN) 100 MG   |  3 (three) times     |         |         |      |      | e     |
| capsule              | daily.               |         |         |      |      |       |
+----------------------+----------------------+---------+---------+------+------+-------+
|   Cholecalciferol    | Take 1,000 Units by  |         |         |      |      | Activ |
| 1000 units capsule   | mouth daily.         |         |         |      |      | e     |
+----------------------+----------------------+---------+---------+------+------+-------+
|   metFORMIN          | Take 2 tablets by    |   60    | 11      |  |  | Activ |
| (GLUCOPHAGE-XR) 500  | mouth daily with     | tablet  |         |  |  | e     |
| MG 24 hr tablet      | dinner.              |         |         | 18   | 19   |       |
+----------------------+----------------------+---------+---------+------+------+-------+
|   liraglutide        | Inject 0.6 mg into   |   3 mL  | 11      |  |      | Activ |
| (VICTOZA) 18 MG/3ML  | the skin daily.      |         |         | 20 |      | e     |
| injection            |                      |         |         | 18   |      |       |
+----------------------+----------------------+---------+---------+------+------+-------+
|   Insulin Pen Needle | 1 each by Does not   |   100   | 11      | /2 |      | Activ |
|  (PEN NEEDLES) 31G X | apply route daily.   | each    |         | /20 |      | e     |
|  6 MM MISC           |                      |         |         | 18   |      |       |
+----------------------+----------------------+---------+---------+------+------+-------+
|   insulin detemir    | Inject 70 Units into |   20 mL | 5       | 11/0 |      | Activ |
| (LEVEMIR) 100        |  the skin nightly.   |         |         | 1/20 |      | e     |
| UNIT/ML              |                      |         |         | 18   |      |       |
| injectionIndications |                      |         |         |      |      |       |
| : 30 units AM, 40    |                      |         |         |      |      |       |
| units PM             |                      |         |         |      |      |       |
 
+----------------------+----------------------+---------+---------+------+------+-------+
|   insulin lispro,    | Inject 22 Units into |   20 mL | 5       | 11/0 | 11/0 | Activ |
| human, (HUMALOG) 100 |  the skin 3 (three)  |         |         | 1/20 | 1/20 | e     |
|  UNIT/ML injection   | times daily before   |         |         | 18   | 19   |       |
|                      | meals.               |         |         |      |      |       |
+----------------------+----------------------+---------+---------+------+------+-------+
|   insulin detemir    | Inject 70 Units into |         |         |      | 11/0 | Disco |
| (LEVEMIR) 100        |  the skin nightly.   |         |         |      | 1/20 | ntinu |
| UNIT/ML              |                      |         |         |      | 18   | ed    |
| injectionIndications |                      |         |         |      |      |       |
| : 30 units AM, 40    |                      |         |         |      |      |       |
| units PM             |                      |         |         |      |      |       |
+----------------------+----------------------+---------+---------+------+------+-------+
|   insulin aspart     | Inject 22 Units into |   20 mL | 12      | 09/0 | 11/0 | Disco |
| (NOVOLOG) 100        |  the skin 3 (three)  |         |         | 6/20 | 1/20 | ntinu |
| UNIT/ML injection    | times daily before   |         |         | 18   | 18   | ed    |
|                      | meals.               |         |         |      |      |       |
+----------------------+----------------------+---------+---------+------+------+-------+
|   insulin glargine   | Inject 70 Units into |   30 mL | 12      | 09/0 | 11/0 | Disco |
| (BASAGLAR KWIKPEN)   |  the skin nightly.   |         |         | 6/20 | 1/20 | ntinu |
| 100 UNIT/ML          |                      |         |         | 18   | 18   | ed    |
| injection            |                      |         |         |      |      |       |
+----------------------+----------------------+---------+---------+------+------+-------+
 
 
 
 Active Problems
 No known active problems
 
 Encounters
 
 
+--------+-------------+-----------+----------------------+----------------------+
| Date   | Type        | Specialty | Care Team            | Description          |
+--------+-------------+-----------+----------------------+----------------------+
| 10/30/ | Refill      |           |   Brodie Celaya,  |                      |
|    |             |           | MD                   |                      |
+--------+-------------+-----------+----------------------+----------------------+
| / | Telephone   |           |   Maria E Franklin,    | Other                |
|    |             |           | CMA                  |                      |
+--------+-------------+-----------+----------------------+----------------------+
| / | Hospital    |           |   See, Medical       | Diagnosis unknown    |
|    | Encounter   |           | Record               |                      |
+--------+-------------+-----------+----------------------+----------------------+
| / | Hospital    |           |   See, Medical       | Diagnosis unknown    |
|    | Encounter   |           | Record               |                      |
+--------+-------------+-----------+----------------------+----------------------+
| / | Procedure   |           |                      |                      |
|    | Pass        |           |                      |                      |
+--------+-------------+-----------+----------------------+----------------------+
| / | Procedure   |           |                      |                      |
|    | Pass        |           |                      |                      |
+--------+-------------+-----------+----------------------+----------------------+
| / | Ancillary   |           |   See, Medical       | Diagnosis unknown    |
| 2018   | Orders      |           | Record               |                      |
+--------+-------------+-----------+----------------------+----------------------+
| / | Documentati |           |   Diego Villalobos,   |                      |
|    | on Only     |           | ARNP                 |                      |
+--------+-------------+-----------+----------------------+----------------------+
| / | Telephone   |           |   Guadalupe Klein,    |                      |
 
|    |             |           | CNA                  |                      |
+--------+-------------+-----------+----------------------+----------------------+
| / | Hospital    |           |   Diego Villalobos,   | Cervicalgia;         |
| 2018   | Encounter   |           | ARNP                 | Numbness and         |
|        |             |           |                      | tingling of right    |
|        |             |           |                      | arm; Numbness and    |
|        |             |           |                      | tingling in left arm |
+--------+-------------+-----------+----------------------+----------------------+
| / | Office      |           |   Diego Villalobos,   | Cervicalgia (Primary |
| 2018   | Visit       |           | ARNP                 |  Dx); Numbness and   |
|        |             |           |                      | tingling of right    |
|        |             |           |                      | arm; Numbness and    |
|        |             |           |                      | tingling in left arm |
+--------+-------------+-----------+----------------------+----------------------+
| / | Telephone   |           |   Diego Villalobos,   | Imaging (pt is       |
| 2018   |             |           | ARNP                 | requesting to speak  |
|        |             |           |                      | to the MA, pt has    |
|        |             |           |                      | her CT scan and xray |
|        |             |           |                      |  info from last time |
|        |             |           |                      |  she has them done.  |
|        |             |           |                      | )                    |
+--------+-------------+-----------+----------------------+----------------------+
| / | Orders Only |           |   Rupali Gu,  | Cervicalgia (Primary |
| 2018   |             |           | CMA                  |  Dx)                 |
+--------+-------------+-----------+----------------------+----------------------+
 from Last 3 Months
 
 Immunizations
 
 
+----------------------+------------------------+----------+
| Name                 | Dates Previously Given | Next Due |
+----------------------+------------------------+----------+
| Hepatitis B          | 2003             |          |
+----------------------+------------------------+----------+
| Hepatitis B Adult    | 2003             |          |
+----------------------+------------------------+----------+
| Influenza, Trivalent | 2015             |          |
|  W/Preservative      |                        |          |
+----------------------+------------------------+----------+
| Pneumococcal         | 2014             |          |
| Polysaccharide       |                        |          |
| 23-valent            |                        |          |
+----------------------+------------------------+----------+
 
 
 
 Family History
 
 
+------------------+-----------+------+----------+
| Medical History  | Relation  | Name | Comments |
+------------------+-----------+------+----------+
| Diabetes type II | Father    |      |          |
+------------------+-----------+------+----------+
| Diabetes type II | Maternal  |      |          |
|                  | Grandmoth |      |          |
|                  | er        |      |          |
+------------------+-----------+------+----------+
| Diabetes type II | Mother    |      |          |
 
+------------------+-----------+------+----------+
| Diabetes type II | Paternal  |      |          |
|                  | Grandfath |      |          |
|                  | er        |      |          |
+------------------+-----------+------+----------+
 
 
 
+----------------------+------+--------+----------+
| Relation             | Name | Status | Comments |
+----------------------+------+--------+----------+
| Father               |      |        |          |
+----------------------+------+--------+----------+
| Maternal Grandmother |      |        |          |
+----------------------+------+--------+----------+
| Mother               |      |        |          |
+----------------------+------+--------+----------+
| Paternal Grandfather |      |        |          |
+----------------------+------+--------+----------+
 
 
 
 Social History
 
 
+--------------+-------+-----------+--------+------+
| Tobacco Use  | Types | Packs/Day | Years  | Date |
|              |       |           | Used   |      |
+--------------+-------+-----------+--------+------+
| Never Smoker |       |           |        |      |
+--------------+-------+-----------+--------+------+
 
 
 
+---------------------+---+---+---+
| Smokeless Tobacco:  |   |   |   |
| Never Used          |   |   |   |
+---------------------+---+---+---+
 
 
 
+------------------+---------------+
| Sex Assigned at  | Date Recorded |
| Birth            |               |
+------------------+---------------+
| Not on file      |               |
+------------------+---------------+
 
 
 
 Last Filed Vital Signs
 
 
+-------------------+-------------------+-------------------------+
| Vital Sign        | Reading           | Time Taken              |
+-------------------+-------------------+-------------------------+
| Blood Pressure    | 137/95            | 2018  2:50 PM PDT |
+-------------------+-------------------+-------------------------+
| Pulse             | 92                | 2018  2:50 PM PDT |
+-------------------+-------------------+-------------------------+
 
| Temperature       | -                 | -                       |
+-------------------+-------------------+-------------------------+
| Respiratory Rate  | -                 | -                       |
+-------------------+-------------------+-------------------------+
| Oxygen Saturation | 98%               | 01/15/2018  2:33 PM PST |
+-------------------+-------------------+-------------------------+
| Inhaled Oxygen    | -                 | -                       |
| Concentration     |                   |                         |
+-------------------+-------------------+-------------------------+
| Weight            | 117.9 kg (260 lb) | 2018  2:50 PM PDT |
+-------------------+-------------------+-------------------------+
| Height            | 165.1 cm (5' 5")  | 2018  2:50 PM PDT |
+-------------------+-------------------+-------------------------+
| Body Mass Index   | 43.27             | 2018  2:50 PM PDT |
+-------------------+-------------------+-------------------------+
 
 
 
 Plan of Treatment
 
 
+---------------------+-----------+------------+----------+
| Health Maintenance  | Due Date  | Last Done  | Comments |
+---------------------+-----------+------------+----------+
| Vaccine:            | 10/27/199 |            |          |
| Dtap/Tdap/Td (1 -   | 5         |            |          |
| Tdap)               |           |            |          |
+---------------------+-----------+------------+----------+
| Cervical Cancer     | 10/27/200 |            |          |
| Screening (Pap)     | 6         |            |          |
+---------------------+-----------+------------+----------+
| Vaccine: Influenza  |  | 2015 |          |
| (#1)                | 8         |            |          |
+---------------------+-----------+------------+----------+
 
 
 
 Procedures
 
 
+--------------------+--------+-------------+----------------------+----------------------+
| Procedure Name     | Priori | Date/Time   | Associated Diagnosis | Comments             |
|                    | ty     |             |                      |                      |
+--------------------+--------+-------------+----------------------+----------------------+
| XR CERVICAL SPINE  | Routin | 2018  |   Cervicalgia        |   Results for this   |
| LIMITED 2-3 VIEW   | e      |  4:13 PM    | Numbness and         | procedure are in the |
|                    |        | PDT         | tingling of right    |  results section.    |
|                    |        |             | arm  Numbness and    |                      |
|                    |        |             | tingling in left arm |                      |
+--------------------+--------+-------------+----------------------+----------------------+
 from Last 3 Months
 
 Results
 X-ray cervical spine limited 2-3 view (2018  4:13 PM)
 
+--------------------------------------------------------------------+--------------+
| Impressions                                                        | Performed At |
+--------------------------------------------------------------------+--------------+
|   1.    Mild to moderate multilevel degenerative change of the     |   [facility]     |
| cervical spine.     Electronically signed by Kaden Benjamin DO on  | RADIOLOGY    |
 
| 2018 4:58 PM                                                  |              |
+--------------------------------------------------------------------+--------------+
 
 
 
+------------------------------------------------------------------------+--------------+
| Narrative                                                              | Performed At |
+------------------------------------------------------------------------+--------------+
|   CATRACHITO BLOCK  1975  42 years Female  XR CERVICAL SPINE       |   [facility]     |
| LIMITED 2-3 VIEW  2018 4:13 PM     INDICATION: Neck pain          | RADIOLOGY    |
| COMPARISON: None     TECHNIQUE: Lateral flexion-extension views of the |              |
|  cervical spine.     FINDINGS: Vertebral heights and alignment are     |              |
| maintained. Mild to moderate disc space narrowing with endplate        |              |
| spurring from C4 through T1. Prevertebral soft tissues are             |              |
| unremarkable. Negative for fracture. No evidence for instability on    |              |
| flexion and extension images.                                          |              |
+------------------------------------------------------------------------+--------------+
 
 
 
+-------------------------------------------------------------------------------------------
--------------------------------------------------------------------------------------------
---------------------------+
| Procedure Note                                                                            
                                                                                            
                           |
+-------------------------------------------------------------------------------------------
--------------------------------------------------------------------------------------------
---------------------------+
|   Humberto Jasmine Results In - 2018  5:03 PM PDT  CATRACHITO BLOCK[birth year]89 years         
                                                                                            
                           |
| FemaleXR CERVICAL SPINE LIMITED 2-3 VIEW2018 4:13 PMINDICATION: Neck                 
                                                                                            
                           |
| painCOMPARISON: NoneTECHNIQUE: Lateral flexion-extension views of the cervical            
                                                                                            
                           |
| spine.FINDINGS: Vertebral heights and alignment are maintained. Mild to moderate disc     
                                                                                            
                           |
| space narrowing with endplate spurring from C4 through T1. Prevertebral soft tissues are  
                                                                                            
                           |
|  unremarkable. Negative for fracture. No evidence for instability on flexion and          
                                                                                            
                           |
| extension images.IMPRESSION:1.  Mild to moderate multilevel degenerative change of the    
                                                                                            
                           |
| cervical spine.Electronically signed by Kaden Benjamin DO on 2018 4:58 PM            
                                                                                            
                           |
|COMPARISON: None                                                                           
                                                                                            
                           |
|                                                                                           
                                                                                            
                           |
|TECHNIQUE: Lateral flexion-extension views of the cervical spine.                          
 
                                                                                            
                           |
|                                                                                           
                                                                                            
                           |
|FINDINGS: Vertebral heights and alignment are maintained. Mild to moderate disc space narro
wing with endplate spurring from C4 through T1. Prevertebral soft tissues are unremarkable. 
Negative for fracture. No  |
|evidence for instability on flexion and extension images.                                  
                                                                                            
                           |
|                                                                                           
                                                                                            
                           |
|IMPRESSION:                                                                                
                                                                                            
                          |
|1.  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                 
                                                                                            
                           |
|                                                                                           
                                                                                            
                           |
|Electronically signed by Kaden Benjamin DO on 2018 4:58 PM                            
                                                                                            
                           |
+-------------------------------------------------------------------------------------------
--------------------------------------------------------------------------------------------
---------------------------+
 
 
 
+--------------------+------------------+--------------------+--------------+
| Performing         | Address          | City/State/Zipcode | Phone Number |
| Organization       |                  |                    |              |
+--------------------+------------------+--------------------+--------------+
|   [facility] |   [address] | [place] [address] |              |
+--------------------+------------------+--------------------+--------------+
 from Last 3 Months
 
 Insurance
 
 
+---------+--------+-------------+------+-------+------------------+
| Payer   | Benefi | Subscriber  | Type | Phone | Address          |
|         | t Plan | ID          |      |       |                  |
|         |  /     |             |      |       |                  |
|         | Group  |             |      |       |                  |
+---------+--------+-------------+------+-------+------------------+
| [employer] | [employer] | [id number]   |      |       |   [address] [address]   |
|         | A BLUE |             |      |       | [place]      |
|         |  CROSS |             |      |       | [id number]       |
|         |  FED   |             |      |       |                  |
|         | PPO    |             |      |       |                  |
+---------+--------+-------------+------+-------+------------------+
 
 
 
+------------------+--------+-------------+--------+-------------+----------------------+
| Guarantor Name   | [employer] | Relation to | Date   | Phone       | Billing Address      |
 
|                  | t Type |  Patient    | of     |             |                      |
|                  |        |             | Birth  |             |                      |
+------------------+--------+-------------+--------+-------------+----------------------+
| [employer] | Person | Self        | [birth year] |   Home:     |   [address]  |
|                  | [address] |             |    | [phone] | [address]    |
|                  | [place]    |             |        | [phone]        | [phone]           |
+------------------+--------+-------------+--------+-------------+----------------------+

## 2018-11-07 NOTE — XMS
Clinical Summary
  Created on: 2018
 
 Catrachito Block
 External Reference #: [id number]
 : [birth year]
 Sex: Female
 
 Demographics
 
 
+-----------------------+---------------------------+
| Address               | [address]       |
|                       | [place]  [address] |
+-----------------------+---------------------------+
| Home Phone            | [phone]           |
+-----------------------+---------------------------+
| Preferred Language    | Unknown                   |
+-----------------------+---------------------------+
| Marital Status        | Single                    |
+-----------------------+---------------------------+
| Adventist Affiliation | Unknown                   |
+-----------------------+---------------------------+
| Race                  | Unknown                   |
+-----------------------+---------------------------+
| Ethnic Group          | Unknown                   |
+-----------------------+---------------------------+
 
 
 Author
 
 
+--------------+-----------------------+
| Author       | [employer] |
+--------------+-----------------------+
| Organization | [facility] [employer] [facility] |
+--------------+-----------------------+
| Address      | Unknown               |
+--------------+-----------------------+
| Phone        | Unavailable           |
+--------------+-----------------------+
 
 
 
 Support
 
 
+--------------+--------------+---------+-----------------+
| Name         | Relationship | Address | Phone           |
+--------------+--------------+---------+-----------------+
| Renu Austin | ECON         | Unknown | [phone] |
+--------------+--------------+---------+-----------------+
 
 
 
 Care Team Providers
 
 
 
+------------------------+------+-----------------+
| Care Team Member Name  | Role | Phone           |
+------------------------+------+-----------------+
| Cassandra Teixeira PA-C | PP   | [phone] |
+------------------------+------+-----------------+
 
 
 
 Allergies
 
 
+----------------+---------------------+----------+----------+----------+
| Active Allergy | Reactions           | Severity | Noted    | Comments |
|                |                     |          | Date     |          |
+----------------+---------------------+----------+----------+----------+
| Erythromycin   | Nausea and Vomiting | Low      | 01/15/20 |          |
|                |                     |          | 18       |          |
+----------------+---------------------+----------+----------+----------+
| Latex          | Hives               | High     | 10/21/20 |          |
|                |                     |          | 16       |          |
+----------------+---------------------+----------+----------+----------+
| Lisinopril     | Cough               | Low      | 01/15/20 |          |
|                |                     |          | 18       |          |
+----------------+---------------------+----------+----------+----------+
 
 
 
 Current Medications
 
 
+----------------------+----------------------+---------+---------+------+------+-------+
| Prescription         | Sig.                 | Disp.   | Refills | Star | End  | Statu |
|                      |                      |         |         | t    | Date | s     |
|                      |                      |         |         | Date |      |       |
+----------------------+----------------------+---------+---------+------+------+-------+
|   gabapentin         | Take 100 mg by mouth |         |         |      |      | Activ |
| (NEURONTIN) 100 MG   |  3 (three) times     |         |         |      |      | e     |
| capsule              | daily.               |         |         |      |      |       |
+----------------------+----------------------+---------+---------+------+------+-------+
|   Cholecalciferol    | Take 1,000 Units by  |         |         |      |      | Activ |
| 1000 units capsule   | mouth daily.         |         |         |      |      | e     |
+----------------------+----------------------+---------+---------+------+------+-------+
|   metFORMIN          | Take 2 tablets by    |   60    | 11      |  |  | Activ |
| (GLUCOPHAGE-XR) 500  | mouth daily with     | tablet  |         |  |  | e     |
| MG 24 hr tablet      | dinner.              |         |         | 18   | 19   |       |
+----------------------+----------------------+---------+---------+------+------+-------+
|   liraglutide        | Inject 0.6 mg into   |   3 mL  | 11      |  |      | Activ |
| (VICTOZA) 18 MG/3ML  | the skin daily.      |         |         | 20 |      | e     |
| injection            |                      |         |         | 18   |      |       |
+----------------------+----------------------+---------+---------+------+------+-------+
|   Insulin Pen Needle | 1 each by Does not   |   100   | 11      | /2 |      | Activ |
|  (PEN NEEDLES) 31G X | apply route daily.   | each    |         | /20 |      | e     |
|  6 MM MISC           |                      |         |         | 18   |      |       |
+----------------------+----------------------+---------+---------+------+------+-------+
|   insulin detemir    | Inject 70 Units into |   20 mL | 5       | 11/0 |      | Activ |
| (LEVEMIR) 100        |  the skin nightly.   |         |         | 1/20 |      | e     |
| UNIT/ML              |                      |         |         | 18   |      |       |
| injectionIndications |                      |         |         |      |      |       |
| : 30 units AM, 40    |                      |         |         |      |      |       |
| units PM             |                      |         |         |      |      |       |
 
+----------------------+----------------------+---------+---------+------+------+-------+
|   insulin lispro,    | Inject 22 Units into |   20 mL | 5       | 11/0 | 11/0 | Activ |
| human, (HUMALOG) 100 |  the skin 3 (three)  |         |         | 1/20 | 1/20 | e     |
|  UNIT/ML injection   | times daily before   |         |         | 18   | 19   |       |
|                      | meals.               |         |         |      |      |       |
+----------------------+----------------------+---------+---------+------+------+-------+
|   insulin detemir    | Inject 70 Units into |         |         |      | 11/0 | Disco |
| (LEVEMIR) 100        |  the skin nightly.   |         |         |      | 1/20 | ntinu |
| UNIT/ML              |                      |         |         |      | 18   | ed    |
| injectionIndications |                      |         |         |      |      |       |
| : 30 units AM, 40    |                      |         |         |      |      |       |
| units PM             |                      |         |         |      |      |       |
+----------------------+----------------------+---------+---------+------+------+-------+
|   insulin aspart     | Inject 22 Units into |   20 mL | 12      | 09/0 | 11/0 | Disco |
| (NOVOLOG) 100        |  the skin 3 (three)  |         |         | 6/20 | 1/20 | ntinu |
| UNIT/ML injection    | times daily before   |         |         | 18   | 18   | ed    |
|                      | meals.               |         |         |      |      |       |
+----------------------+----------------------+---------+---------+------+------+-------+
|   insulin glargine   | Inject 70 Units into |   30 mL | 12      | 09/0 | 11/0 | Disco |
| (BASAGLAR KWIKPEN)   |  the skin nightly.   |         |         | 6/20 | 1/20 | ntinu |
| 100 UNIT/ML          |                      |         |         | 18   | 18   | ed    |
| injection            |                      |         |         |      |      |       |
+----------------------+----------------------+---------+---------+------+------+-------+
 
 
 
 Active Problems
 No known active problems
 
 Encounters
 
 
+--------+-------------+-----------+----------------------+----------------------+
| Date   | Type        | Specialty | Care Team            | Description          |
+--------+-------------+-----------+----------------------+----------------------+
| 10/30/ | Refill      |           |   Brodie Celaya,  |                      |
|    |             |           | MD                   |                      |
+--------+-------------+-----------+----------------------+----------------------+
| / | Telephone   |           |   Maria E Franklin,    | Other                |
|    |             |           | CMA                  |                      |
+--------+-------------+-----------+----------------------+----------------------+
| / | Hospital    |           |   See, Medical       | Diagnosis unknown    |
|    | Encounter   |           | Record               |                      |
+--------+-------------+-----------+----------------------+----------------------+
| / | Hospital    |           |   See, Medical       | Diagnosis unknown    |
|    | Encounter   |           | Record               |                      |
+--------+-------------+-----------+----------------------+----------------------+
| / | Procedure   |           |                      |                      |
|    | Pass        |           |                      |                      |
+--------+-------------+-----------+----------------------+----------------------+
| / | Procedure   |           |                      |                      |
|    | Pass        |           |                      |                      |
+--------+-------------+-----------+----------------------+----------------------+
| / | Ancillary   |           |   See, Medical       | Diagnosis unknown    |
| 2018   | Orders      |           | Record               |                      |
+--------+-------------+-----------+----------------------+----------------------+
| / | Documentati |           |   Diego Villalobos,   |                      |
|    | on Only     |           | ARNP                 |                      |
+--------+-------------+-----------+----------------------+----------------------+
| / | Telephone   |           |   Guadalupe Klein,    |                      |
 
|    |             |           | CNA                  |                      |
+--------+-------------+-----------+----------------------+----------------------+
| / | Hospital    |           |   Diego Villalobos,   | Cervicalgia;         |
| 2018   | Encounter   |           | ARNP                 | Numbness and         |
|        |             |           |                      | tingling of right    |
|        |             |           |                      | arm; Numbness and    |
|        |             |           |                      | tingling in left arm |
+--------+-------------+-----------+----------------------+----------------------+
| / | Office      |           |   Diego Villalobos,   | Cervicalgia (Primary |
| 2018   | Visit       |           | ARNP                 |  Dx); Numbness and   |
|        |             |           |                      | tingling of right    |
|        |             |           |                      | arm; Numbness and    |
|        |             |           |                      | tingling in left arm |
+--------+-------------+-----------+----------------------+----------------------+
| / | Telephone   |           |   Diego Villalobos,   | Imaging (pt is       |
| 2018   |             |           | ARNP                 | requesting to speak  |
|        |             |           |                      | to the MA, pt has    |
|        |             |           |                      | her CT scan and xray |
|        |             |           |                      |  info from last time |
|        |             |           |                      |  she has them done.  |
|        |             |           |                      | )                    |
+--------+-------------+-----------+----------------------+----------------------+
| / | Orders Only |           |   Rupali Gu,  | Cervicalgia (Primary |
| 2018   |             |           | CMA                  |  Dx)                 |
+--------+-------------+-----------+----------------------+----------------------+
 from Last 3 Months
 
 Immunizations
 
 
+----------------------+------------------------+----------+
| Name                 | Dates Previously Given | Next Due |
+----------------------+------------------------+----------+
| Hepatitis B          | 2003             |          |
+----------------------+------------------------+----------+
| Hepatitis B Adult    | 2003             |          |
+----------------------+------------------------+----------+
| Influenza, Trivalent | 2015             |          |
|  W/Preservative      |                        |          |
+----------------------+------------------------+----------+
| Pneumococcal         | 2014             |          |
| Polysaccharide       |                        |          |
| 23-valent            |                        |          |
+----------------------+------------------------+----------+
 
 
 
 Family History
 
 
+------------------+-----------+------+----------+
| Medical History  | Relation  | Name | Comments |
+------------------+-----------+------+----------+
| Diabetes type II | Father    |      |          |
+------------------+-----------+------+----------+
| Diabetes type II | Maternal  |      |          |
|                  | Grandmoth |      |          |
|                  | er        |      |          |
+------------------+-----------+------+----------+
| Diabetes type II | Mother    |      |          |
 
+------------------+-----------+------+----------+
| Diabetes type II | Paternal  |      |          |
|                  | Grandfath |      |          |
|                  | er        |      |          |
+------------------+-----------+------+----------+
 
 
 
+----------------------+------+--------+----------+
| Relation             | Name | Status | Comments |
+----------------------+------+--------+----------+
| Father               |      |        |          |
+----------------------+------+--------+----------+
| Maternal Grandmother |      |        |          |
+----------------------+------+--------+----------+
| Mother               |      |        |          |
+----------------------+------+--------+----------+
| Paternal Grandfather |      |        |          |
+----------------------+------+--------+----------+
 
 
 
 Social History
 
 
+--------------+-------+-----------+--------+------+
| Tobacco Use  | Types | Packs/Day | Years  | Date |
|              |       |           | Used   |      |
+--------------+-------+-----------+--------+------+
| Never Smoker |       |           |        |      |
+--------------+-------+-----------+--------+------+
 
 
 
+---------------------+---+---+---+
| Smokeless Tobacco:  |   |   |   |
| Never Used          |   |   |   |
+---------------------+---+---+---+
 
 
 
+------------------+---------------+
| Sex Assigned at  | Date Recorded |
| Birth            |               |
+------------------+---------------+
| Not on file      |               |
+------------------+---------------+
 
 
 
 Last Filed Vital Signs
 
 
+-------------------+-------------------+-------------------------+
| Vital Sign        | Reading           | Time Taken              |
+-------------------+-------------------+-------------------------+
| Blood Pressure    | 137/95            | 2018  2:50 PM PDT |
+-------------------+-------------------+-------------------------+
| Pulse             | 92                | 2018  2:50 PM PDT |
+-------------------+-------------------+-------------------------+
 
| Temperature       | -                 | -                       |
+-------------------+-------------------+-------------------------+
| Respiratory Rate  | -                 | -                       |
+-------------------+-------------------+-------------------------+
| Oxygen Saturation | 98%               | 01/15/2018  2:33 PM PST |
+-------------------+-------------------+-------------------------+
| Inhaled Oxygen    | -                 | -                       |
| Concentration     |                   |                         |
+-------------------+-------------------+-------------------------+
| Weight            | 117.9 kg (260 lb) | 2018  2:50 PM PDT |
+-------------------+-------------------+-------------------------+
| Height            | 165.1 cm (5' 5")  | 2018  2:50 PM PDT |
+-------------------+-------------------+-------------------------+
| Body Mass Index   | 43.27             | 2018  2:50 PM PDT |
+-------------------+-------------------+-------------------------+
 
 
 
 Plan of Treatment
 
 
+---------------------+-----------+------------+----------+
| Health Maintenance  | Due Date  | Last Done  | Comments |
+---------------------+-----------+------------+----------+
| Vaccine:            | 10/27/199 |            |          |
| Dtap/Tdap/Td (1 -   | 5         |            |          |
| Tdap)               |           |            |          |
+---------------------+-----------+------------+----------+
| Cervical Cancer     | 10/27/200 |            |          |
| Screening (Pap)     | 6         |            |          |
+---------------------+-----------+------------+----------+
| Vaccine: Influenza  |  | 2015 |          |
| (#1)                | 8         |            |          |
+---------------------+-----------+------------+----------+
 
 
 
 Procedures
 
 
+--------------------+--------+-------------+----------------------+----------------------+
| Procedure Name     | Priori | Date/Time   | Associated Diagnosis | Comments             |
|                    | ty     |             |                      |                      |
+--------------------+--------+-------------+----------------------+----------------------+
| XR CERVICAL SPINE  | Routin | 2018  |   Cervicalgia        |   Results for this   |
| LIMITED 2-3 VIEW   | e      |  4:13 PM    | Numbness and         | procedure are in the |
|                    |        | PDT         | tingling of right    |  results section.    |
|                    |        |             | arm  Numbness and    |                      |
|                    |        |             | tingling in left arm |                      |
+--------------------+--------+-------------+----------------------+----------------------+
 from Last 3 Months
 
 Results
 X-ray cervical spine limited 2-3 view (2018  4:13 PM)
 
+--------------------------------------------------------------------+--------------+
| Impressions                                                        | Performed At |
+--------------------------------------------------------------------+--------------+
|   1.    Mild to moderate multilevel degenerative change of the     |   [facility]     |
| cervical spine.     Electronically signed by Kaden Benjamin DO on  | RADIOLOGY    |
 
| 2018 4:58 PM                                                  |              |
+--------------------------------------------------------------------+--------------+
 
 
 
+------------------------------------------------------------------------+--------------+
| Narrative                                                              | Performed At |
+------------------------------------------------------------------------+--------------+
|   CATRACHITO BLOCK  1975  42 years Female  XR CERVICAL SPINE       |   [facility]     |
| LIMITED 2-3 VIEW  2018 4:13 PM     INDICATION: Neck pain          | RADIOLOGY    |
| COMPARISON: None     TECHNIQUE: Lateral flexion-extension views of the |              |
|  cervical spine.     FINDINGS: Vertebral heights and alignment are     |              |
| maintained. Mild to moderate disc space narrowing with endplate        |              |
| spurring from C4 through T1. Prevertebral soft tissues are             |              |
| unremarkable. Negative for fracture. No evidence for instability on    |              |
| flexion and extension images.                                          |              |
+------------------------------------------------------------------------+--------------+
 
 
 
+-------------------------------------------------------------------------------------------
--------------------------------------------------------------------------------------------
---------------------------+
| Procedure Note                                                                            
                                                                                            
                           |
+-------------------------------------------------------------------------------------------
--------------------------------------------------------------------------------------------
---------------------------+
|   Humberto Jasmine Results In - 2018  5:03 PM PDT  CATRACHITO BLOCK[birth year]45 years         
                                                                                            
                           |
| FemaleXR CERVICAL SPINE LIMITED 2-3 VIEW2018 4:13 PMINDICATION: Neck                 
                                                                                            
                           |
| painCOMPARISON: NoneTECHNIQUE: Lateral flexion-extension views of the cervical            
                                                                                            
                           |
| spine.FINDINGS: Vertebral heights and alignment are maintained. Mild to moderate disc     
                                                                                            
                           |
| space narrowing with endplate spurring from C4 through T1. Prevertebral soft tissues are  
                                                                                            
                           |
|  unremarkable. Negative for fracture. No evidence for instability on flexion and          
                                                                                            
                           |
| extension images.IMPRESSION:1.  Mild to moderate multilevel degenerative change of the    
                                                                                            
                           |
| cervical spine.Electronically signed by Kaden Benjamin DO on 2018 4:58 PM            
                                                                                            
                           |
|COMPARISON: None                                                                           
                                                                                            
                           |
|                                                                                           
                                                                                            
                           |
|TECHNIQUE: Lateral flexion-extension views of the cervical spine.                          
 
                                                                                            
                           |
|                                                                                           
                                                                                            
                           |
|FINDINGS: Vertebral heights and alignment are maintained. Mild to moderate disc space narro
wing with endplate spurring from C4 through T1. Prevertebral soft tissues are unremarkable. 
Negative for fracture. No  |
|evidence for instability on flexion and extension images.                                  
                                                                                            
                           |
|                                                                                           
                                                                                            
                           |
|IMPRESSION:                                                                                
                                                                                            
                          |
|1.  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                 
                                                                                            
                           |
|                                                                                           
                                                                                            
                           |
|Electronically signed by Kaden Benjamin DO on 2018 4:58 PM                            
                                                                                            
                           |
+-------------------------------------------------------------------------------------------
--------------------------------------------------------------------------------------------
---------------------------+
 
 
 
+--------------------+------------------+--------------------+--------------+
| Performing         | Address          | City/State/Zipcode | Phone Number |
| Organization       |                  |                    |              |
+--------------------+------------------+--------------------+--------------+
|   [facility] |   [address] | [place] [address] |              |
+--------------------+------------------+--------------------+--------------+
 from Last 3 Months
 
 Insurance
 
 
+---------+--------+-------------+------+-------+------------------+
| Payer   | Benefi | Subscriber  | Type | Phone | Address          |
|         | t Plan | ID          |      |       |                  |
|         |  /     |             |      |       |                  |
|         | Group  |             |      |       |                  |
+---------+--------+-------------+------+-------+------------------+
| [employer] | PREMER | [id number]   |      |       |   [address] [address]   |
|         | A BLUE |             |      |       | [place]      |
|         |  CROSS |             |      |       | [address]       |
|         |  FED   |             |      |       |                  |
|         | PPO    |             |      |       |                  |
+---------+--------+-------------+------+-------+------------------+
 
 
 
+------------------+--------+-------------+--------+-------------+----------------------+
| Guarantor Name   | Accoun | Relation to | Date   | Phone       | Billing Address      |
 
|                  | t Type |  Patient    | of     |             |                      |
|                  |        |             | Birth  |             |                      |
+------------------+--------+-------------+--------+-------------+----------------------+
| [facility] | Person | Self        | [birth year] |   Home:     |   [address]  |
|                  | [address] |             |    | [phone] | [address]  [place]    |
|                  | [place]    |             |        | [phone]        | [id number]           |
+------------------+--------+-------------+--------+-------------+----------------------+

## 2018-11-07 NOTE — XMS
Encounter Summary
  Created on: 2018
 
 Catrachito Elam
 External Reference #: [id number]
 : [birth year]
 Sex: Female
 
 Demographics
 
 
+-----------------------+---------------------------+
| Address               | [address]       |
|                       | [place]  [address] |
+-----------------------+---------------------------+
| Home Phone            | [phone]           |
+-----------------------+---------------------------+
| Preferred Language    | Unknown                   |
+-----------------------+---------------------------+
| Marital Status        | Single                    |
+-----------------------+---------------------------+
| Restorationist Affiliation | Unknown                   |
+-----------------------+---------------------------+
| Race                  | Unknown                   |
+-----------------------+---------------------------+
| Ethnic Group          | Unknown                   |
+-----------------------+---------------------------+
 
 
 Author
 
 
+--------------+-----------------------+
| Author       | [facility] |
+--------------+-----------------------+
| Organization | [facility] |
+--------------+-----------------------+
| Address      | Unknown               |
+--------------+-----------------------+
| Phone        | Unavailable           |
+--------------+-----------------------+
 
 
 
 Support
 
 
+--------------+--------------+---------+-----------------+
| Name         | Relationship | Address | Phone           |
+--------------+--------------+---------+-----------------+
| Renu Austin | ECON         | Unknown | [phone] |
+--------------+--------------+---------+-----------------+
 
 
 
 Care Team Providers
 
 
 
+------------------------+------+-----------------+
| Care Team Member Name  | Role | Phone           |
+------------------------+------+-----------------+
| Cassandra Teixeira PA-C | PCP  | [phone] |
+------------------------+------+-----------------+
 
 
 
 Encounter Details
 
 
+--------+------------+----------------------+-----------+-------------+
| Date   | Type       | Department           | Care Team | Description |
+--------+------------+----------------------+-----------+-------------+
| / | Procedure  |   [facility] PHYSICIAN     |           |             |
|    | Pass       | [facility] |           |             |
|        |            |  RADIOLOGY  [address]      |           |             |
|        |            | [address]           |           |             |
|        |            | [place] [address]   |           |             |
|        |            | [phone]         |           |             |
+--------+------------+----------------------+-----------+-------------+
 
 
 
 Social History
 
 
+--------------+-------+-----------+--------+------+
| Tobacco Use  | Types | Packs/Day | Years  | Date |
|              |       |           | Used   |      |
+--------------+-------+-----------+--------+------+
| Never Smoker |       |           |        |      |
+--------------+-------+-----------+--------+------+
 
 
 
+---------------------+---+---+---+
| Smokeless Tobacco:  |   |   |   |
| Never Used          |   |   |   |
+---------------------+---+---+---+
 
 
 
+------------------+---------------+
| Sex Assigned at  | Date Recorded |
| Birth            |               |
+------------------+---------------+
| Not on file      |               |
+------------------+---------------+
 as of this encounter
 
 Plan of Treatment
 Not on fileas of this encounter
 
 Visit Diagnoses
 Not on filein this encounter
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
## 2018-11-07 NOTE — XMS
Clinical Summary
  Created on: 2018
 
 Catrachito Elam
 External Reference #: [id number]
 : [birth year]
 Sex: Female
 
 Demographics
 
 
+-----------------------+-------------------------+
| Address               | [address]  |
|                       | [place]  [address]     |
+-----------------------+-------------------------+
| Home Phone            | [phone]         |
+-----------------------+-------------------------+
| Preferred Language    | Unknown                 |
+-----------------------+-------------------------+
| Marital Status        | Unknown                 |
+-----------------------+-------------------------+
| Restorationism Affiliation | Unknown                 |
+-----------------------+-------------------------+
| Race                  | Unknown                 |
+-----------------------+-------------------------+
| Ethnic Group          | Unknown                 |
+-----------------------+-------------------------+
 
 
 Author
 
 
+--------------+--------------------------+
| Author       | [facility] |
+--------------+--------------------------+
| Organization | [facility] |
+--------------+--------------------------+
| Address      | Unknown                  |
+--------------+--------------------------+
| Phone        | Unavailable              |
+--------------+--------------------------+
 
 
 
 Care Team Providers
 
 
+-----------------------+------+-------------+
| Care Team Member Name | Role | Phone       |
+-----------------------+------+-------------+
 PP   | Unavailable |
+-----------------------+------+-------------+
 
 
 
 Source Comments
 MASSIEL is fully live on both [facility] and [facility]
 
 
 Allergies
 Not on File
 
 Current Medications
 Not on file
 
 Active Problems
 Not on file
 
 Social History
 
 
+----------------+-------+-----------+--------+------+
| Tobacco Use    | Types | Packs/Day | Years  | Date |
|                |       |           | Used   |      |
+----------------+-------+-----------+--------+------+
| Never Assessed |       |           |        |      |
+----------------+-------+-----------+--------+------+
 
 
 
+------------------+---------------+
| Sex Assigned at  | Date Recorded |
| Birth            |               |
+------------------+---------------+
| Not on file      |               |
+------------------+---------------+
 
 
 
 Plan of Treatment
 
 
+--------------------+-----------+------------+----------+
| Health Maintenance | Due Date  | Last Done  | Comments |
+--------------------+-----------+------------+----------+
| Influenza (Flu)    |  | 2015 |          |
| vaccination (#1)   | 8         |            |          |
+--------------------+-----------+------------+----------+
 
 
 
 Results
 Not on filefrom Last 3 Months

## 2024-09-19 ENCOUNTER — HOSPITAL ENCOUNTER (EMERGENCY)
Dept: HOSPITAL 46 - ED | Age: 49
Discharge: HOME | End: 2024-09-19
Payer: COMMERCIAL

## 2024-09-19 VITALS — HEIGHT: 65 IN | WEIGHT: 238.32 LBS | BODY MASS INDEX: 39.71 KG/M2

## 2024-09-19 VITALS — DIASTOLIC BLOOD PRESSURE: 66 MMHG | SYSTOLIC BLOOD PRESSURE: 122 MMHG

## 2024-09-19 DIAGNOSIS — Z88.8: ICD-10-CM

## 2024-09-19 DIAGNOSIS — N13.2: Primary | ICD-10-CM

## 2024-09-19 DIAGNOSIS — E78.00: ICD-10-CM

## 2024-09-19 DIAGNOSIS — E11.9: ICD-10-CM

## 2024-09-19 DIAGNOSIS — Z88.1: ICD-10-CM

## 2024-09-19 DIAGNOSIS — I10: ICD-10-CM

## 2024-09-19 DIAGNOSIS — Z79.899: ICD-10-CM

## 2024-09-19 DIAGNOSIS — Z91.040: ICD-10-CM

## 2024-09-19 LAB
ALBUMIN SERPL-MCNC: 3.7 G/DL (ref 3.4–5)
ALBUMIN/GLOB SERPL: 0.95 {RATIO} (ref 1.1–2.4)
ALP SERPL-CCNC: 86 U/L (ref 46–116)
ALT SERPL W P-5'-P-CCNC: 22 U/L (ref 14–59)
ANION GAP SERPL CALCULATED.4IONS-SCNC: 12.7 MMOL/L (ref 7–21)
AST SERPL-CCNC: 14 U/L (ref 15–37)
BACTERIA #/AREA URNS HPF: (no result) /HPF
BASOPHILS NFR BLD AUTO: 0.2 % (ref 0–2)
BUN SERPL-MCNC: 13 MG/DL (ref 7–18)
BUN/CREAT SERPL: 18.3 (ref 6–28.6)
CALCIUM SERPL-MCNC: 9.2 MG/DL (ref 8.5–10.1)
CASTS #/AREA URNS LPF: (no result) "LPF"
CHLORIDE SERPL-SCNC: 104 MMOL/L (ref 98–107)
CO2 SERPL-SCNC: 28 MMOL/L (ref 21–32)
CRYSTALS URNS MICRO: (no result)
DEPRECATED RDW RBC AUTO: 13.8 FL (ref 10.5–15)
EGFRCR SERPLBLD CKD-EPI 2021: 105 ML/MIN (ref 60–?)
EOSINOPHIL NFR BLD AUTO: 0.5 % (ref 0–6)
EPI CELLS #/AREA URNS HPF: 0 /LPF
GLOBULIN SER-MCNC: 3.9 G/DL (ref 1.8–3.5)
HCT VFR BLD AUTO: 39.8 % (ref 35–50)
HGB BLD-MCNC: 13.5 G/DL (ref 12–18)
HGB UR QL STRIP: (no result)
KETONES UR QL STRIP: (no result)
LEUKOCYTE ESTERASE UR QL STRIP: NEGATIVE
LYMPHOCYTES NFR BLD AUTO: 10.2 % (ref 24–44)
MCH RBC QN AUTO: 27.3 PG (ref 27–36)
MCHC RBC AUTO-ENTMCNC: 34 G/DL (ref 30–36)
MCV RBC AUTO: 80.4 FL (ref 81–99)
MONOCYTES NFR BLD AUTO: 2.6 % (ref 0–12)
NEUTROPHILS NFR BLD AUTO: 86.5 % (ref 39–80)
NITRITE UR QL STRIP: NEGATIVE
PLATELET # BLD AUTO: 185 K/UL (ref 140–440)
POTASSIUM SERPL-SCNC: 3.7 MMOL/L (ref 3.5–5.1)
PROT SERPL-MCNC: 7.6 G/DL (ref 6.4–8.2)
RBC # BLD AUTO: 4.94 M/UL (ref 4.3–5.7)
URN SPEC COLLECT METH UR: (no result)